# Patient Record
Sex: FEMALE | Race: WHITE | NOT HISPANIC OR LATINO | URBAN - METROPOLITAN AREA
[De-identification: names, ages, dates, MRNs, and addresses within clinical notes are randomized per-mention and may not be internally consistent; named-entity substitution may affect disease eponyms.]

---

## 2024-02-18 ENCOUNTER — INPATIENT (INPATIENT)
Facility: HOSPITAL | Age: 89
LOS: 5 days | Discharge: NURSING, REHAB OR LONG-TERM CARE | DRG: 177 | End: 2024-02-24
Attending: STUDENT IN AN ORGANIZED HEALTH CARE EDUCATION/TRAINING PROGRAM | Admitting: STUDENT IN AN ORGANIZED HEALTH CARE EDUCATION/TRAINING PROGRAM
Payer: MEDICARE

## 2024-02-18 VITALS — DIASTOLIC BLOOD PRESSURE: 79 MMHG | SYSTOLIC BLOOD PRESSURE: 142 MMHG | HEART RATE: 90 BPM | OXYGEN SATURATION: 96 %

## 2024-02-18 DIAGNOSIS — M25.461 EFFUSION, RIGHT KNEE: ICD-10-CM

## 2024-02-18 DIAGNOSIS — E78.5 HYPERLIPIDEMIA, UNSPECIFIED: ICD-10-CM

## 2024-02-18 DIAGNOSIS — D64.9 ANEMIA, UNSPECIFIED: ICD-10-CM

## 2024-02-18 DIAGNOSIS — G93.40 ENCEPHALOPATHY, UNSPECIFIED: ICD-10-CM

## 2024-02-18 DIAGNOSIS — E87.8 OTHER DISORDERS OF ELECTROLYTE AND FLUID BALANCE, NOT ELSEWHERE CLASSIFIED: ICD-10-CM

## 2024-02-18 DIAGNOSIS — K21.9 GASTRO-ESOPHAGEAL REFLUX DISEASE WITHOUT ESOPHAGITIS: ICD-10-CM

## 2024-02-18 DIAGNOSIS — R47.9 UNSPECIFIED SPEECH DISTURBANCES: ICD-10-CM

## 2024-02-18 DIAGNOSIS — Z29.9 ENCOUNTER FOR PROPHYLACTIC MEASURES, UNSPECIFIED: ICD-10-CM

## 2024-02-18 DIAGNOSIS — N39.0 URINARY TRACT INFECTION, SITE NOT SPECIFIED: ICD-10-CM

## 2024-02-18 DIAGNOSIS — R79.1 ABNORMAL COAGULATION PROFILE: ICD-10-CM

## 2024-02-18 DIAGNOSIS — E03.9 HYPOTHYROIDISM, UNSPECIFIED: ICD-10-CM

## 2024-02-18 DIAGNOSIS — F41.9 ANXIETY DISORDER, UNSPECIFIED: ICD-10-CM

## 2024-02-18 LAB
ALBUMIN SERPL ELPH-MCNC: 3.6 G/DL — SIGNIFICANT CHANGE UP (ref 3.3–5)
ALP SERPL-CCNC: 89 U/L — SIGNIFICANT CHANGE UP (ref 40–120)
ALT FLD-CCNC: 11 U/L — SIGNIFICANT CHANGE UP (ref 10–45)
ANION GAP SERPL CALC-SCNC: 13 MMOL/L — SIGNIFICANT CHANGE UP (ref 5–17)
APPEARANCE UR: ABNORMAL
APTT BLD: 31.9 SEC — SIGNIFICANT CHANGE UP (ref 24.5–35.6)
AST SERPL-CCNC: 27 U/L — SIGNIFICANT CHANGE UP (ref 10–40)
BACTERIA # UR AUTO: ABNORMAL /HPF
BASE EXCESS BLDV CALC-SCNC: 1.7 MMOL/L — SIGNIFICANT CHANGE UP (ref -2–3)
BASOPHILS # BLD AUTO: 0.01 K/UL — SIGNIFICANT CHANGE UP (ref 0–0.2)
BASOPHILS NFR BLD AUTO: 0.1 % — SIGNIFICANT CHANGE UP (ref 0–2)
BILIRUB SERPL-MCNC: 1 MG/DL — SIGNIFICANT CHANGE UP (ref 0.2–1.2)
BILIRUB UR-MCNC: ABNORMAL
BUN SERPL-MCNC: 23 MG/DL — SIGNIFICANT CHANGE UP (ref 7–23)
CA-I SERPL-SCNC: 0.82 MMOL/L — LOW (ref 1.15–1.33)
CALCIUM SERPL-MCNC: 6.2 MG/DL — CRITICAL LOW (ref 8.4–10.5)
CAST: 25 /LPF — HIGH (ref 0–4)
CHLORIDE BLDV-SCNC: 99 MMOL/L — SIGNIFICANT CHANGE UP (ref 96–108)
CHLORIDE SERPL-SCNC: 98 MMOL/L — SIGNIFICANT CHANGE UP (ref 96–108)
CO2 BLDV-SCNC: 28 MMOL/L — HIGH (ref 22–26)
CO2 SERPL-SCNC: 23 MMOL/L — SIGNIFICANT CHANGE UP (ref 22–31)
COARSE GRAN CASTS #/AREA URNS AUTO: PRESENT
COLOR SPEC: SIGNIFICANT CHANGE UP
CREAT SERPL-MCNC: 0.92 MG/DL — SIGNIFICANT CHANGE UP (ref 0.5–1.3)
DIFF PNL FLD: ABNORMAL
EGFR: 58 ML/MIN/1.73M2 — LOW
EOSINOPHIL # BLD AUTO: 0.01 K/UL — SIGNIFICANT CHANGE UP (ref 0–0.5)
EOSINOPHIL NFR BLD AUTO: 0.1 % — SIGNIFICANT CHANGE UP (ref 0–6)
FLUAV AG NPH QL: SIGNIFICANT CHANGE UP
FLUBV AG NPH QL: SIGNIFICANT CHANGE UP
GAS PNL BLDV: 131 MMOL/L — LOW (ref 136–145)
GAS PNL BLDV: SIGNIFICANT CHANGE UP
GAS PNL BLDV: SIGNIFICANT CHANGE UP
GLUCOSE BLDV-MCNC: 106 MG/DL — HIGH (ref 70–99)
GLUCOSE SERPL-MCNC: 114 MG/DL — HIGH (ref 70–99)
GLUCOSE UR QL: NEGATIVE MG/DL — SIGNIFICANT CHANGE UP
HCO3 BLDV-SCNC: 27 MMOL/L — SIGNIFICANT CHANGE UP (ref 22–29)
HCT VFR BLD CALC: 32.8 % — LOW (ref 34.5–45)
HCT VFR BLDA CALC: 32 % — LOW (ref 34.5–46.5)
HGB BLD CALC-MCNC: 10.6 G/DL — LOW (ref 11.7–16.1)
HGB BLD-MCNC: 10.4 G/DL — LOW (ref 11.5–15.5)
IMM GRANULOCYTES NFR BLD AUTO: 1.2 % — HIGH (ref 0–0.9)
INR BLD: 1.62 RATIO — HIGH (ref 0.85–1.18)
KETONES UR-MCNC: ABNORMAL MG/DL
LACTATE BLDV-MCNC: 1.4 MMOL/L — SIGNIFICANT CHANGE UP (ref 0.5–2)
LEUKOCYTE ESTERASE UR-ACNC: ABNORMAL
LYMPHOCYTES # BLD AUTO: 0.77 K/UL — LOW (ref 1–3.3)
LYMPHOCYTES # BLD AUTO: 8.2 % — LOW (ref 13–44)
MAGNESIUM SERPL-MCNC: 0.8 MG/DL — CRITICAL LOW (ref 1.6–2.6)
MCHC RBC-ENTMCNC: 31 PG — SIGNIFICANT CHANGE UP (ref 27–34)
MCHC RBC-ENTMCNC: 31.7 GM/DL — LOW (ref 32–36)
MCV RBC AUTO: 97.9 FL — SIGNIFICANT CHANGE UP (ref 80–100)
MONOCYTES # BLD AUTO: 1.25 K/UL — HIGH (ref 0–0.9)
MONOCYTES NFR BLD AUTO: 13.3 % — SIGNIFICANT CHANGE UP (ref 2–14)
NEUTROPHILS # BLD AUTO: 7.22 K/UL — SIGNIFICANT CHANGE UP (ref 1.8–7.4)
NEUTROPHILS NFR BLD AUTO: 77.1 % — HIGH (ref 43–77)
NITRITE UR-MCNC: NEGATIVE — SIGNIFICANT CHANGE UP
NRBC # BLD: 0 /100 WBCS — SIGNIFICANT CHANGE UP (ref 0–0)
PCO2 BLDV: 42 MMHG — SIGNIFICANT CHANGE UP (ref 39–42)
PH BLDV: 7.41 — SIGNIFICANT CHANGE UP (ref 7.32–7.43)
PH UR: 6.5 — SIGNIFICANT CHANGE UP (ref 5–8)
PHOSPHATE SERPL-MCNC: 3 MG/DL — SIGNIFICANT CHANGE UP (ref 2.5–4.5)
PHOSPHATE SERPL-MCNC: 3 MG/DL — SIGNIFICANT CHANGE UP (ref 2.5–4.5)
PLATELET # BLD AUTO: 152 K/UL — SIGNIFICANT CHANGE UP (ref 150–400)
PO2 BLDV: 48 MMHG — HIGH (ref 25–45)
POTASSIUM BLDV-SCNC: 4.7 MMOL/L — SIGNIFICANT CHANGE UP (ref 3.5–5.1)
POTASSIUM SERPL-MCNC: 4.4 MMOL/L — SIGNIFICANT CHANGE UP (ref 3.5–5.3)
POTASSIUM SERPL-SCNC: 4.4 MMOL/L — SIGNIFICANT CHANGE UP (ref 3.5–5.3)
PROT SERPL-MCNC: 6.3 G/DL — SIGNIFICANT CHANGE UP (ref 6–8.3)
PROT UR-MCNC: 100 MG/DL
PROTHROM AB SERPL-ACNC: 16.8 SEC — HIGH (ref 9.5–13)
RBC # BLD: 3.35 M/UL — LOW (ref 3.8–5.2)
RBC # FLD: 17 % — HIGH (ref 10.3–14.5)
RBC CASTS # UR COMP ASSIST: 38 /HPF — HIGH (ref 0–4)
REVIEW: SIGNIFICANT CHANGE UP
RSV RNA NPH QL NAA+NON-PROBE: SIGNIFICANT CHANGE UP
SAO2 % BLDV: 73.5 % — SIGNIFICANT CHANGE UP (ref 67–88)
SARS-COV-2 RNA SPEC QL NAA+PROBE: SIGNIFICANT CHANGE UP
SODIUM SERPL-SCNC: 134 MMOL/L — LOW (ref 135–145)
SP GR SPEC: 1.03 — SIGNIFICANT CHANGE UP (ref 1–1.03)
SQUAMOUS # UR AUTO: >36 /HPF — HIGH (ref 0–5)
TROPONIN T, HIGH SENSITIVITY RESULT: 51 NG/L — SIGNIFICANT CHANGE UP (ref 0–51)
TROPONIN T, HIGH SENSITIVITY RESULT: 54 NG/L — HIGH (ref 0–51)
UROBILINOGEN FLD QL: 1 MG/DL — SIGNIFICANT CHANGE UP (ref 0.2–1)
WBC # BLD: 9.37 K/UL — SIGNIFICANT CHANGE UP (ref 3.8–10.5)
WBC # FLD AUTO: 9.37 K/UL — SIGNIFICANT CHANGE UP (ref 3.8–10.5)
WBC CLUMPS # UR AUTO: PRESENT
WBC UR QL: 103 /HPF — HIGH (ref 0–5)

## 2024-02-18 PROCEDURE — 0042T: CPT | Mod: MA

## 2024-02-18 PROCEDURE — 99223 1ST HOSP IP/OBS HIGH 75: CPT | Mod: GC

## 2024-02-18 PROCEDURE — 70450 CT HEAD/BRAIN W/O DYE: CPT | Mod: 26,MA,59

## 2024-02-18 PROCEDURE — 99285 EMERGENCY DEPT VISIT HI MDM: CPT

## 2024-02-18 PROCEDURE — 70496 CT ANGIOGRAPHY HEAD: CPT | Mod: 26,MA

## 2024-02-18 PROCEDURE — 71045 X-RAY EXAM CHEST 1 VIEW: CPT | Mod: 26

## 2024-02-18 PROCEDURE — 70498 CT ANGIOGRAPHY NECK: CPT | Mod: 26,MA

## 2024-02-18 RX ORDER — CALCIUM GLUCONATE 100 MG/ML
1 VIAL (ML) INTRAVENOUS ONCE
Refills: 0 | Status: COMPLETED | OUTPATIENT
Start: 2024-02-18 | End: 2024-02-18

## 2024-02-18 RX ORDER — CEFTRIAXONE 500 MG/1
1000 INJECTION, POWDER, FOR SOLUTION INTRAMUSCULAR; INTRAVENOUS EVERY 24 HOURS
Refills: 0 | Status: DISCONTINUED | OUTPATIENT
Start: 2024-02-19 | End: 2024-02-19

## 2024-02-18 RX ORDER — ONDANSETRON 8 MG/1
4 TABLET, FILM COATED ORAL EVERY 8 HOURS
Refills: 0 | Status: DISCONTINUED | OUTPATIENT
Start: 2024-02-18 | End: 2024-02-24

## 2024-02-18 RX ORDER — PANTOPRAZOLE SODIUM 20 MG/1
1 TABLET, DELAYED RELEASE ORAL
Refills: 0 | DISCHARGE

## 2024-02-18 RX ORDER — MAGNESIUM SULFATE 500 MG/ML
2 VIAL (ML) INJECTION ONCE
Refills: 0 | Status: COMPLETED | OUTPATIENT
Start: 2024-02-18 | End: 2024-02-18

## 2024-02-18 RX ORDER — LEVOTHYROXINE SODIUM 125 MCG
1 TABLET ORAL
Refills: 0 | DISCHARGE

## 2024-02-18 RX ORDER — ACETAMINOPHEN 500 MG
2 TABLET ORAL
Refills: 0 | DISCHARGE

## 2024-02-18 RX ORDER — CEFTRIAXONE 500 MG/1
1000 INJECTION, POWDER, FOR SOLUTION INTRAMUSCULAR; INTRAVENOUS ONCE
Refills: 0 | Status: COMPLETED | OUTPATIENT
Start: 2024-02-18 | End: 2024-02-18

## 2024-02-18 RX ORDER — ALBUTEROL 90 UG/1
3 AEROSOL, METERED ORAL
Refills: 0 | DISCHARGE

## 2024-02-18 RX ORDER — MAGNESIUM SULFATE 500 MG/ML
2 VIAL (ML) INJECTION EVERY 4 HOURS
Refills: 0 | Status: COMPLETED | OUTPATIENT
Start: 2024-02-18 | End: 2024-02-19

## 2024-02-18 RX ADMIN — Medication 25 GRAM(S): at 22:40

## 2024-02-18 RX ADMIN — Medication 100 GRAM(S): at 17:22

## 2024-02-18 RX ADMIN — CEFTRIAXONE 100 MILLIGRAM(S): 500 INJECTION, POWDER, FOR SOLUTION INTRAMUSCULAR; INTRAVENOUS at 17:20

## 2024-02-18 RX ADMIN — Medication 25 GRAM(S): at 17:20

## 2024-02-18 NOTE — H&P ADULT - NSHPPHYSICALEXAM_GEN_ALL_CORE
T(C): 37.7 (02-18-24 @ 16:25), Max: 37.7 (02-18-24 @ 16:25)  HR: 98 (02-18-24 @ 16:25) (90 - 98)  BP: 132/77 (02-18-24 @ 16:25) (132/77 - 142/79)  RR: 18 (02-18-24 @ 16:25) (18 - 18)  SpO2: 98% (02-18-24 @ 16:25) (96% - 98%)    PHYSICAL EXAM:  GENERAL: NAD  HEAD:  Atraumatic, Normocephalic  EYES: PERRLA, conjunctiva and sclera clear, no jaundice   ENMT Trace white mucus in oropharynx. Dry mucous membranes  NECK: Supple, non tender   HEART: Regular rate and rhythm; No murmurs, rubs, or gallops. S1/S2 present  RESPIRATORY: CTA B/L, No W/R/R  ABDOMEN: Soft, Nontender, Nondistended; Bowel sounds present   NEUROLOGY: A&Ox1 (name), occasionally follows simple commands  EXTREMITIES: Shoulders appear symmetric in size and shape. TTP, warm and swelling of L knee and mild swelling of LLE. No clubbing, cyanosis, or edema   SKIN: dry erythematous lesion of RLE

## 2024-02-18 NOTE — ED PROVIDER NOTE - OBJECTIVE STATEMENT
Attending note.  Patient was seen at nurses station as well as CT scan.  Code stroke called upon patient arrival.  Patient was brought in from nursing home with change in speech notes at 7 AM upon patient's awakening.  Additional history from patient's son who states the speech is more garbled than slurred and is fluctuating.  He reports she did not get out of bed yesterday and was weaker.  She had a cough last week which resolved.  Her last hospitalization was approximately 3 years ago.  She has a history of dementia and will typically ambulate with walker with assistance or in wheelchair.  Patient is not able to provide any additional history.  The patient appears to have a facial droop, however her son states that that is typical appearance.

## 2024-02-18 NOTE — H&P ADULT - PROBLEM SELECTOR PLAN 2
Pt w/ change in speech this morning, likely due to infectious vs. metabolic process    -s/p code stroke, unlikely stroke- head imaging w/o acute pathology  -plan as above

## 2024-02-18 NOTE — ED ADULT NURSE NOTE - NSICDXPASTMEDICALHX_GEN_ALL_CORE_FT
PAST MEDICAL HISTORY:  Atrial fibrillation     GERD (gastroesophageal reflux disease)     Hyperlipidemia

## 2024-02-18 NOTE — H&P ADULT - NSHPLABSRESULTS_GEN_ALL_CORE
LABS: Personally reviewed labs, imaging, and ECG                          10.4   9.37  )-----------( 152      ( 2024 16:05 )             32.8           134<L>  |  98  |  23  ----------------------------<  114<H>  4.4   |  23  |  0.92    Ca    6.2<LL>      2024 16:05  Phos  3.0       Mg     .8         TPro  6.3  /  Alb  3.6  /  TBili  1.0  /  DBili  x   /  AST  27  /  ALT  11  /  AlkPhos  89         LIVER FUNCTIONS - ( 2024 16:05 )  Alb: 3.6 g/dL / Pro: 6.3 g/dL / ALK PHOS: 89 U/L / ALT: 11 U/L / AST: 27 U/L / GGT: x                    Urinalysis Basic - ( 2024 16:25 )    Color: Dark Yellow / Appearance: Turbid / S.027 / pH: x  Gluc: x / Ketone: Trace mg/dL  / Bili: Small / Urobili: 1.0 mg/dL   Blood: x / Protein: 100 mg/dL / Nitrite: Negative   Leuk Esterase: Moderate / RBC: 38 /HPF /  /HPF   Sq Epi: x / Non Sq Epi: >36 /HPF / Bacteria: Too Numerous to count /HPF        PT/INR - ( 2024 16:05 )   PT: 16.8 sec;   INR: 1.62 ratio         PTT - ( 2024 16:05 )  PTT:31.9 sec    Lactate Trend      CAPILLARY BLOOD GLUCOSE  117 (2024 19:36)      POCT Blood Glucose.: 117 mg/dL (2024 15:45)        RADIOLOGY & ADDITIONAL TESTS:    < from: CT Angio Brain Stroke Protocol  w/ IV Cont (24 @ 16:17) >    IMPRESSION:    CT PERFUSION: No focal perfusion deficit on visual assessment.    MRI brain may be obtained for further assessment.    CT ANGIOGRAPHY NECK: No vessel narrowing or occlusion in the neck.    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  _____________  NASCET criteria for internal carotid artery stenosis:  Mild: 0% to 49%  Moderate: 50% to 69%  Severe: 70% to 99%  Complete Occlusion      < end of copied text >

## 2024-02-18 NOTE — ED PROVIDER NOTE - NSICDXPASTMEDICALHX_GEN_ALL_CORE_FT
PAST MEDICAL HISTORY:  GERD (gastroesophageal reflux disease)     Hyperlipidemia      PAST MEDICAL HISTORY:  Atrial fibrillation     GERD (gastroesophageal reflux disease)     Hyperlipidemia

## 2024-02-18 NOTE — ED ADULT NURSE REASSESSMENT NOTE - NS ED NURSE REASSESS COMMENT FT1
Patient straight cath'd for urine using sterile technique. Second RN GISELLE and LEANNE present to confirm sterility. Explained procedure as it was being done - Pt tolerated procedure well. Sterile specimens collected and sent to lab as ordered. drained aprox 75ml of urine. Comfort and safety provided.

## 2024-02-18 NOTE — H&P ADULT - ATTENDING COMMENTS
89F PMHx hypothyroid, afib not on AC, GERD, HLD. Presenting w/ slurred speech and lethargy. LKW was 2/17 when pt went to bed. Code stroke upon arrival, CT scans neg for stroke; found aneurysm. Pt also found w/ low Ca and low Mg, UA suspicions for UTI. Pt also found w/ R knee swelling, which was not reported by family.     #UTI  #metabolic encephalopathy  -c/w CTX  -acute stroke r/o'd. per neuro rec, encephalopathy labs ordered.     #R knee swelling  -DDx includes gout  vs. pseudogout, vs. fall/injury vs. septic joint  -f/u uric acid level  -f/u MRSA swab since if septic joint, already on ctx  -f/u knee u/s   -consider rheum c/s     #hypoCa  #hypoMg  -likely nutritional deficiency  -ekg w/o acute changes  -replete prn

## 2024-02-18 NOTE — H&P ADULT - HISTORY OF PRESENT ILLNESS
89 yo F w/ PMHx of hypothyroidism, atrial fibrillation (not on A/C due to age), GERD, HLD presenting for change in speech and lethargy since this morning. Per son Ross, pt uses a walker at baseline and was having a difficult time getting around yesterday and was not walking or eating much. Today, pt's son was called because pt woke up and looked terrible and grey, Pt's speech was garbled, sounded like mumbling, and pt was more lethargic, she also complained of L knee pain and it was more swollen than usual. Also w/ L shoulder pain. Her son came to the living facility with his kids and she was reorientable and her speech seemed to improve. She ate a cereal. After her son left, the facility stated that pt began to deteriorate again w/ mumbling speech and they said they were going to call an ambulance. Son stated that pt also had a cough last week which she was started on albuterol for and the cough completely resolved. At baseline, pt is A&Ox2, she is usually confused but pleasant w/ poor short term memory but able to be reoriented.    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 Pt also received Mag, calcium, and ceftriaxone.

## 2024-02-18 NOTE — H&P ADULT - PROBLEM SELECTOR PLAN 1
Baseline A&Ox2, presently A&OX1 w/ garbled speech. Pt w/ change this morning, appearing more lethargic w/ change in speech. Ddx: infectious (UTI, septic knee joint?) vs. metabolic vs. neurologic     -code stroke called on 2/18, w/ nonlateralizing neuro exam  -CT head 2/18: no acute findings   -trend WBC, temp; elevated Neutrophil %  -BCx, UCx, UA+, CXR, RVP negative  -mag and calcium low  -trop 54->51, EKG   -neurology following  -Vitamin B1, B6, B12, Folate, Vitamin D 25OH, Vitamin E, TSH, FT4, Ammonia, CK  -if no clinical improvement after addressing above medical problems, consider EEG and/or MRI Brain w/wo IV contrast  -NSGY evaluation in AM given 5.5 x 5.0 x 5.8 mm aneurysm at the left A2 A3 bifurcation Baseline A&Ox2, presently A&OX1 w/ garbled speech. Pt w/ change this morning, appearing more lethargic w/ change in speech. Ddx: infectious (UTI, septic knee joint?) vs. metabolic vs. neurologic     -code stroke called on 2/18, w/ nonlateralizing neuro exam  -CT head 2/18: no acute findings   -trend WBC, temp; elevated Neutrophil %  -BCx, UCx, UA+, CXR, RVP negative, MRSA  -s/p ceftriaxone, c/w ceftriaxone; consider addition of cefazolin for gram+  -mag and calcium low  -trop 54->51, EKG   -neurology following  -Vitamin B1, B6, B12, Folate, Vitamin D 25OH, Vitamin E, TSH, FT4, Ammonia, CK  -if no clinical improvement after addressing above medical problems, consider EEG and/or MRI Brain w/wo IV contrast  -NSGY evaluation in AM given 5.5 x 5.0 x 5.8 mm aneurysm at the left A2 A3 bifurcation Baseline A&Ox2, presently A&OX1 w/ garbled speech. Pt w/ change this morning, appearing more lethargic w/ change in speech. Ddx: infectious (UTI, septic knee joint?) vs. metabolic vs. neurologic     -code stroke called on 2/18, w/ nonlateralizing neuro exam  -CT head 2/18: no acute findings   -trend WBC, temp; elevated Neutrophil %  -BCx, UCx, UA+, CXR, RVP negative, MRSA  -s/p ceftriaxone, c/w ceftriaxone  -mag and calcium low  -trop 54->51, EKG   -neurology following  -Vitamin B1, B6, B12, Folate, Vitamin D 25OH, Vitamin E, TSH, FT4, Ammonia, CK  -if no clinical improvement after addressing above medical problems, consider EEG and/or MRI Brain w/wo IV contrast  -NSGY evaluation in AM given 5.5 x 5.0 x 5.8 mm aneurysm at the left A2 A3 bifurcation

## 2024-02-18 NOTE — ED PROVIDER NOTE - DIFFERENTIAL DIAGNOSIS
Differential Diagnosis Garbled speech and decreased appetite and energy yesterday.  Differential not limited to CVA/TIA versus infection versus metabolic/electrolyte abnormality.

## 2024-02-18 NOTE — H&P ADULT - PROBLEM SELECTOR PLAN 5
Pt found to have Mag of 0.8 and Ca 5    -s/p Mag, Ca x1  -c/w repletion  -pending dysphagia can supplement mag PO Pt found to have Mag of 0.8 and Ca 5    -s/p Mag, Ca x1  -c/w repletion  -pending dysphagia screen can supplement mag PO

## 2024-02-18 NOTE — H&P ADULT - PROBLEM SELECTOR PLAN 10
DVT ppx: lovenox    Diet: NPO iso AMS, pending S&S    Dispo: TBA    Ethics: Full Code for now, son Ross is leaning towards DNR/DNI but will talk with his siblings DVT ppx: SCDs    Diet: NPO iso AMS, pending S&S    Dispo: TBA    Ethics: Full Code for now, son Ross is leaning towards DNR/DNI but will talk with his siblings

## 2024-02-18 NOTE — ED PROVIDER NOTE - PHYSICAL EXAMINATION
Attending note.  Patient is arousable with verbal stimulation.  Pupils are 2 mm equal reactive.  She has dry mucous membranes.  Neck is supple.  Lungs are clear and equal bilaterally.  Heart is regular in rhythm.  Abdomen is obese, soft and nontender.  There is no guarding or rebound.  Patient has arthritic changes and swelling to both knees.  There is pitting edema in the ankles bilaterally.  Patient's speech is garbled.  There is no pronator drift in the upper extremity.  She has good  strength in both upper extremities.  Patient does not follow directions to left her legs.

## 2024-02-18 NOTE — CONSULT NOTE ADULT - ASSESSMENT
Impression: Altered Mental Status with nonfocal neuro exam, likely i/s/o toxic metabolic infectious processes such as UTI, hypomagnesemia, and hypocalcemia.     Recommendations:  [ ] Toxic metabolic workup: Infectious workup (BCx, UCx, UA, CXR, RVP+COVID), CBC, CMP, Mg, Phos, Vitamin B1, B6, B12, Folate, Vitamin D 25OH, Vitamin E, TSH, FT4, Ammonia, Lactate, CK,   [ ] If no clinical improvement after addressing above medical problems, consider EEG and/or MRI Brain w/wo IV contrast    Case to be seen by neuro attending on AM rounds. Recommendations not finalized until attested by attending.   Impression: Altered Mental Status with nonfocal neuro exam, likely i/s/o toxic metabolic infectious processes such as UTI, hypomagnesemia, and hypocalcemia. Lower suspicion for primary neurologic process at this time.     Recommendations:  [ ] Toxic metabolic workup: Infectious workup (BCx, UCx, UA, CXR, RVP+COVID), CBC, CMP, Mg, Phos, Vitamin B1, B6, B12, Folate, Vitamin D 25OH, Vitamin E, TSH, FT4, Ammonia, Lactate, CK,   [ ] If no clinical improvement after addressing above medical problems, consider EEG and/or MRI Brain w/wo IV contrast    Case to be seen by neuro attending on AM rounds. Recommendations not finalized until attested by attending.   Impression: Altered Mental Status with nonlateralizing neuro exam, likely i/s/o toxic metabolic infectious processes such as UTI, hypomagnesemia, and hypocalcemia. Lower suspicion for primary neurologic process at this time.     Recommendations:  [ ] Toxic metabolic workup: Infectious workup (BCx, UCx, UA, CXR, RVP+COVID), CBC, CMP, Mg, Phos, Vitamin B1, B6, B12, Folate, Vitamin D 25OH, Vitamin E, TSH, FT4, Ammonia, Lactate, CK,   [ ] If no clinical improvement after addressing above medical problems, consider EEG and/or MRI Brain w/wo IV contrast    Case to be seen by neuro attending on AM rounds. Recommendations not finalized until attested by attending.   Impression: Altered Mental Status with nonlateralizing neuro exam, CTH without acute pathology. AMS likely i/s/o toxic metabolic infectious processes such as UTI, hypomagnesemia, and hypocalcemia. Lower suspicion for primary neurologic process at this time.     Recommendations:  [ ] Toxic metabolic workup: Infectious workup (BCx, UCx, UA, CXR, RVP+COVID), CBC, CMP, Mg, Phos, Vitamin B1, B6, B12, Folate, Vitamin D 25OH, Vitamin E, TSH, FT4, Ammonia, Lactate, CK,   [ ] If no clinical improvement after addressing above medical problems, consider EEG and/or MRI Brain w/wo IV contrast  [ ] Consider NSGY evaluation given 5.5 x 5.0 x 5.8 mm aneurysm at the left A2 A3 bifurcation.    Case to be seen by neuro attending on AM rounds. Recommendations not finalized until attested by attending.   89F pmhx dementia (~AAOX1-2 at baseline), presents as code stroke for lethargy and mumbling. LKW 6:30 PM on 2/17/2024. Per son, pt was noted to be lethargic, not getting out of bed, and mumbling her words as of 7 AM. RoS potentially limited due to mental status, but pt denying focal weakness, numbness/tingling, HA, visual changes. No AC/AP. Uses walker/wheelchair at baseline. In ED T 99.9 oral, SBPs 130s-140s, HR 90s, satting 96 on RA, found to have Ca 6.2, Mg 0.8, and UA c/f UTI (Bacteria too numerous to count, moderate LE, 103 WBCs).     Impression: Altered Mental Status with nonlateralizing neuro exam, CTH without acute pathology. AMS likely i/s/o toxic metabolic infectious processes such as UTI, hypomagnesemia, and hypocalcemia. Lower suspicion for primary neurologic process at this time.     Recommendations:  [ ] Toxic metabolic workup: Infectious workup (BCx, UCx, UA, CXR, RVP+COVID), CBC, CMP, Mg, Phos, Vitamin B1, B6, B12, Folate, Vitamin D 25OH, Vitamin E, TSH, FT4, Ammonia, Lactate, CK,   [ ] If no clinical improvement after addressing above medical problems, consider EEG and/or MRI Brain w/wo IV contrast  [ ] Consider NSGY evaluation given 5.5 x 5.0 x 5.8 mm aneurysm at the left A2 A3 bifurcation.    Case to be seen by neuro attending on AM rounds. Recommendations not finalized until attested by attending.

## 2024-02-18 NOTE — ED ADULT NURSE NOTE - OBJECTIVE STATEMENT
89y female, 89y female, AAOx2, follows commands, amb with walker at baseline, reports chronic LE weakness, LKW 7 am, son reports altered speech, symptoms resolved upon arrival in ED, pt following commands with UE, minimal LE is baseline, denies headache, chest pain, shortness of breath, son reports pt is weaker than normal, 20g right ac, 20g left hand, on cm, placed in gown, side rails up for safety, bed in lowest position, call bell within reach, patient and family educated on plan of care, comfort and safety provided.

## 2024-02-18 NOTE — H&P ADULT - ASSESSMENT
89 yo F w/ PMHx of hypothyroidism, atrial fibrillation (not on A/C due to age), GERD, HLD presenting for change in speech and lethargy since this morning, as well as L knee pain/swelling, w/o acute changes on CT head, found to have UTI and electrolyte derangements, admitted for encephalopathy and L knee pain w/u.

## 2024-02-18 NOTE — CONSULT NOTE ADULT - SUBJECTIVE AND OBJECTIVE BOX
Neurology - Consult Note    -  Spectra: [phone] ([facility]), [phone] ([facility])  -    HPI: 89F pmhx dementia (~AAOX1-2 at baseline), presents as code stroke for lethargy and mumbling. LKW 6:30 PM on 2/17/2024. Per son, pt was noted to be lethargic, not getting out of bed, and mumbling her words as of 7 AM. RoS potentially limited due to mental status, but pt denying focal weakness, numbness/tingling, HA, visual changes. No AC/AP. Uses walker/wheelchair at baseline. In ED T 99.9 oral, SBPs 130s-140s, HR 90s, satting 96 on RA, found to have Ca 6.2, Mg 0.8, and UA c/f UTI (Bacteria too numerous to count, moderate LE, 103 WBCs).     Allergies:  No Known Allergies      PMHx/PSHx/Family Hx: As above, otherwise see below   Hyperlipidemia    GERD (gastroesophageal reflux disease)    Atrial fibrillation        Social Hx:  No current use of tobacco, alcohol, or illicit drugs  Lives with ***    Medications:  MEDICATIONS  (STANDING):    MEDICATIONS  (PRN):      Vitals:  T(C): 37.7 (02-18-24 @ 16:25), Max: 37.7 (02-18-24 @ 16:25)  HR: 98 (02-18-24 @ 16:25) (90 - 98)  BP: 132/77 (02-18-24 @ 16:25) (132/77 - 142/79)  RR: 18 (02-18-24 @ 16:25) (18 - 18)  SpO2: 98% (02-18-24 @ 16:25) (96% - 98%)    Physical Examination:  General - NAD  Cardiovascular - no edema  Eyes -Fundoscopy not well visualized    Neurologic Exam:  Mental status - Lethargic but arousable to LT/voice, attends to examiner. Follows simple commands but struggles with complex ones. Language fluent with intact comprehension and repetition, but speech slurred (of note, pt without dentures). Poor concentration, registration of word, fund of knowledge    Cranial nerves - PERRL, VFF, EOMI, face sensation (V1-V3) intact b/l, facial strength intact without asymmetry b/l, trapezius 5/5 strength b/l, tongue midline on protrusion    Motor - Normal bulk and tone throughout. Maintains extremities antigravity without drift. Unable to perform manual motor strength testing because unable to follow commands.    Sensation - Sensation intact to LT and pain in all extremities    DTR's -             Biceps      Triceps     Brachioradialis      Patellar    Ankle    Toes/plantar response  R             0             0                    0                 0             0                   Mute  L             0             0                    0                 0             0                   Mute    Coordination - No dysmetria on FTN b/l    Gait and station - VIOLA    Labs:                        10.4   9.37  )-----------( 152      ( 18 Feb 2024 16:05 )             32.8     02-18    134<L>  |  98  |  23  ----------------------------<  114<H>  4.4   |  23  |  0.92    Ca    6.2<LL>      18 Feb 2024 16:05  Phos  3.0     02-18  Mg     .8     02-18    TPro  6.3  /  Alb  3.6  /  TBili  1.0  /  DBili  x   /  AST  27  /  ALT  11  /  AlkPhos  89  02-18    CAPILLARY BLOOD GLUCOSE  117 (18 Feb 2024 19:36)      POCT Blood Glucose.: 117 mg/dL (18 Feb 2024 15:45)    LIVER FUNCTIONS - ( 18 Feb 2024 16:05 )  Alb: 3.6 g/dL / Pro: 6.3 g/dL / ALK PHOS: 89 U/L / ALT: 11 U/L / AST: 27 U/L / GGT: x             PT/INR - ( 18 Feb 2024 16:05 )   PT: 16.8 sec;   INR: 1.62 ratio         PTT - ( 18 Feb 2024 16:05 )  PTT:31.9 sec  CSF:                  Radiology:     Neurology - Consult Note    -  Spectra: [id number] ([facility]), [phone] ([facility])  -    HPI: 89F pmhx dementia (~AAOX1-2 at baseline), presents as code stroke for lethargy and mumbling. LKW 6:30 PM on 2/17/2024. Per son, pt was noted to be lethargic, not getting out of bed, and mumbling her words as of 7 AM. RoS potentially limited due to mental status, but pt denying focal weakness, numbness/tingling, HA, visual changes. No AC/AP. Uses walker/wheelchair at baseline. In ED T 99.9 oral, SBPs 130s-140s, HR 90s, satting 96 on RA, found to have Ca 6.2, Mg 0.8, and UA c/f UTI (Bacteria too numerous to count, moderate LE, 103 WBCs).     Allergies:  No Known Allergies      PMHx/PSHx/Family Hx: As above, otherwise see below   Hyperlipidemia    GERD (gastroesophageal reflux disease)    Atrial fibrillation        Social Hx:  No current use of tobacco, alcohol, or illicit drugs  Lives with ***    Medications:  MEDICATIONS  (STANDING):    MEDICATIONS  (PRN):      Vitals:  T(C): 37.7 (02-18-24 @ 16:25), Max: 37.7 (02-18-24 @ 16:25)  HR: 98 (02-18-24 @ 16:25) (90 - 98)  BP: 132/77 (02-18-24 @ 16:25) (132/77 - 142/79)  RR: 18 (02-18-24 @ 16:25) (18 - 18)  SpO2: 98% (02-18-24 @ 16:25) (96% - 98%)    Physical Examination:  General - NAD  Cardiovascular - no edema  Eyes -Fundoscopy not well visualized    Neurologic Exam:  Mental status - Lethargic but arousable to LT/voice, attends to examiner. Follows simple commands but struggles with complex ones. Language fluent with intact comprehension and repetition, but speech slurred (of note, pt without dentures). Poor concentration, registration of word, fund of knowledge. AAOx1     Cranial nerves - PERRL, VFF, EOMI, face sensation (V1-V3) intact b/l, facial strength intact without asymmetry b/l, trapezius 5/5 strength b/l, tongue midline on protrusion    Motor - Normal bulk and tone throughout. Maintains extremities antigravity without drift. Unable to perform manual motor strength testing because unable to follow commands.    Sensation - Sensation intact to LT and pain in all extremities    DTR's -             Biceps      Triceps     Brachioradialis      Patellar    Ankle    Toes/plantar response  R             0             0                    0                 0             0                   Mute  L             0             0                    0                 0             0                   Mute    Coordination - No dysmetria on FTN b/l    Gait and station - VIOLA    Labs:                        10.4   9.37  )-----------( 152      ( 18 Feb 2024 16:05 )             32.8     02-18    134<L>  |  98  |  23  ----------------------------<  114<H>  4.4   |  23  |  0.92    Ca    6.2<LL>      18 Feb 2024 16:05  Phos  3.0     02-18  Mg     .8     02-18    TPro  6.3  /  Alb  3.6  /  TBili  1.0  /  DBili  x   /  AST  27  /  ALT  11  /  AlkPhos  89  02-18    CAPILLARY BLOOD GLUCOSE  117 (18 Feb 2024 19:36)      POCT Blood Glucose.: 117 mg/dL (18 Feb 2024 15:45)    LIVER FUNCTIONS - ( 18 Feb 2024 16:05 )  Alb: 3.6 g/dL / Pro: 6.3 g/dL / ALK PHOS: 89 U/L / ALT: 11 U/L / AST: 27 U/L / GGT: x             PT/INR - ( 18 Feb 2024 16:05 )   PT: 16.8 sec;   INR: 1.62 ratio         PTT - ( 18 Feb 2024 16:05 )  PTT:31.9 sec  CSF:                  Radiology:

## 2024-02-18 NOTE — H&P ADULT - PROBLEM SELECTOR PLAN 3
Hx of recurrent UTIs, however last one was last spring.    -s/p ceftriaxone  -consider cefepime for tx of UTI + Hx of recurrent UTIs, however last one was last spring.    -s/p ceftriaxone  -c/w ceftriaxone; consider addition of cefazolin for gram+ Hx of recurrent UTIs, however last one was last spring.    -s/p ceftriaxone  -c/w ceftriaxone

## 2024-02-18 NOTE — STROKE CODE NOTE - NIH STROKE SCALE: 5B. MOTOR ARM, RIGHT, QM
[General Appearance - In No Acute Distress] : in no acute distress [General Appearance - Alert] : alert [General Appearance - Well Nourished] : well nourished [General Appearance - Well Developed] : well developed [Sclera] : the sclera and conjunctiva were normal [Oropharynx] : the oropharynx was normal [Outer Ear] : the ears and nose were normal in appearance [Neck Cervical Mass (___cm)] : no neck mass was observed [Neck Appearance] : the appearance of the neck was normal [Jugular Venous Distention Increased] : there was no jugular-venous distention [Thyroid Diffuse Enlargement] : the thyroid was not enlarged [Auscultation Breath Sounds / Voice Sounds] : lungs were clear to auscultation bilaterally [Thyroid Nodule] : there were no palpable thyroid nodules [Heart Sounds Gallop] : no gallops [Heart Rate And Rhythm] : heart rate was normal and rhythm regular [Heart Sounds] : normal S1 and S2 [Heart Sounds Pericardial Friction Rub] : no pericardial rub [Murmurs] : no murmurs [Edema] : there was no peripheral edema [Bowel Sounds] : normal bowel sounds [Full Pulse] : the pedal pulses are present [Abdomen Soft] : soft [] : no hepato-splenomegaly [Abdomen Tenderness] : non-tender [Abdomen Mass (___ Cm)] : no abdominal mass palpated (0) No drift; limb holds 90 (or 45) degrees for full 10 secs [Internal Hemorrhoid] : internal hemorrhoids [Normal Sphincter Tone] : normal sphincter tone [No Rectal Mass] : no rectal mass [Prostate Size___ (Scale 0-4)] : prostate size was [unfilled] on a scale of 0-4 [Cervical Lymph Nodes Enlarged Posterior Bilaterally] : posterior cervical [Supraclavicular Lymph Nodes Enlarged Bilaterally] : supraclavicular [Axillary Lymph Nodes Enlarged Bilaterally] : axillary [Cervical Lymph Nodes Enlarged Anterior Bilaterally] : anterior cervical [Femoral Lymph Nodes Enlarged Bilaterally] : femoral [No Spinal Tenderness] : no spinal tenderness [No CVA Tenderness] : no ~M costovertebral angle tenderness [Inguinal Lymph Nodes Enlarged Bilaterally] : inguinal [Skin Color & Pigmentation] : normal skin color and pigmentation [Skin Turgor] : normal skin turgor [Affect] : the affect was normal [Impaired Insight] : insight and judgment were intact [Oriented To Time, Place, And Person] : oriented to person, place, and time [External Hemorrhoid] : no external hemorrhoids [Occult Blood Positive] : stool was negative for occult blood [FreeTextEntry1] : FIT negative [Prostate Tenderness] : was not tender

## 2024-02-18 NOTE — H&P ADULT - PROBLEM SELECTOR PLAN 4
Pt w/ worsening warmth and swelling of L knee and pain, started yesterday. Unknown hx of trauma. Also w/ complaint of L shoulder pain, however appears normal. Ddx of L knee: septic joint vs. gout vs. pseudogout    -c/w cefepime for empiric coverage  -f/u knee US  -f/u duplex, r/o DVT Pt w/ worsening warmth and swelling of L knee and pain, started yesterday. Unknown hx of trauma. Also w/ complaint of L shoulder pain, however appears normal. Ddx of L knee: septic joint vs. gout vs. pseudogout    -rheum consult in AM  -c/w ceftriaxone  -f/u knee US  -f/u duplex, r/o DVT  -f/u MRSA swab

## 2024-02-18 NOTE — ED ADULT NURSE NOTE - NSFALLHARMRISKINTERV_ED_ALL_ED

## 2024-02-18 NOTE — ED PROVIDER NOTE - CLINICAL SUMMARY MEDICAL DECISION MAKING FREE TEXT BOX
Attending note.  See differential above.  Code stroke called upon arrival to the emergency department.  CT head, CT angio head and neck, neurology consultation, labs, EKG troponin, nasal swab, urinalysis, cultures, rectal temperature, VBG

## 2024-02-18 NOTE — PATIENT PROFILE ADULT - FALL HARM RISK - UNIVERSAL INTERVENTIONS
Bed in lowest position, wheels locked, appropriate side rails in place/Call bell, personal items and telephone in reach/Instruct patient to call for assistance before getting out of bed or chair/Non-slip footwear when patient is out of bed/[place] to call system/Physically safe environment - no spills, clutter or unnecessary equipment/Purposeful Proactive Rounding/Room/bathroom lighting operational, light cord in reach

## 2024-02-18 NOTE — H&P ADULT - NSHPSOCIALHISTORY_GEN_ALL_CORE
Pt has history of drinking alcohol more frequently >15 years ago but presently will drink 2 glass of wine a week on occasion. Pt also has hx of smoking cigarettes >70 years ago and no longer smokes.

## 2024-02-19 DIAGNOSIS — Z86.79 PERSONAL HISTORY OF OTHER DISEASES OF THE CIRCULATORY SYSTEM: ICD-10-CM

## 2024-02-19 LAB
ADD ON TEST-SPECIMEN IN LAB: SIGNIFICANT CHANGE UP
ALBUMIN SERPL ELPH-MCNC: 3.5 G/DL — SIGNIFICANT CHANGE UP (ref 3.3–5)
ALP SERPL-CCNC: 93 U/L — SIGNIFICANT CHANGE UP (ref 40–120)
ALT FLD-CCNC: 9 U/L — LOW (ref 10–45)
AMMONIA BLD-MCNC: 24 UMOL/L — SIGNIFICANT CHANGE UP (ref 11–55)
ANION GAP SERPL CALC-SCNC: 16 MMOL/L — SIGNIFICANT CHANGE UP (ref 5–17)
APTT BLD: 30.6 SEC — SIGNIFICANT CHANGE UP (ref 24.5–35.6)
AST SERPL-CCNC: 16 U/L — SIGNIFICANT CHANGE UP (ref 10–40)
BASOPHILS # BLD AUTO: 0 K/UL — SIGNIFICANT CHANGE UP (ref 0–0.2)
BASOPHILS NFR BLD AUTO: 0 % — SIGNIFICANT CHANGE UP (ref 0–2)
BILIRUB SERPL-MCNC: 0.8 MG/DL — SIGNIFICANT CHANGE UP (ref 0.2–1.2)
BUN SERPL-MCNC: 18 MG/DL — SIGNIFICANT CHANGE UP (ref 7–23)
CALCIUM SERPL-MCNC: 7.3 MG/DL — LOW (ref 8.4–10.5)
CHLORIDE SERPL-SCNC: 98 MMOL/L — SIGNIFICANT CHANGE UP (ref 96–108)
CK SERPL-CCNC: 168 U/L — SIGNIFICANT CHANGE UP (ref 25–170)
CO2 SERPL-SCNC: 22 MMOL/L — SIGNIFICANT CHANGE UP (ref 22–31)
CREAT SERPL-MCNC: 0.74 MG/DL — SIGNIFICANT CHANGE UP (ref 0.5–1.3)
CRP SERPL-MCNC: 259 MG/L — HIGH (ref 0–4)
EGFR: 75 ML/MIN/1.73M2 — SIGNIFICANT CHANGE UP
EOSINOPHIL # BLD AUTO: 0.02 K/UL — SIGNIFICANT CHANGE UP (ref 0–0.5)
EOSINOPHIL NFR BLD AUTO: 0.2 % — SIGNIFICANT CHANGE UP (ref 0–6)
ERYTHROCYTE [SEDIMENTATION RATE] IN BLOOD: 38 MM/HR — HIGH (ref 0–20)
FERRITIN SERPL-MCNC: 262 NG/ML — SIGNIFICANT CHANGE UP (ref 13–330)
FOLATE SERPL-MCNC: >20 NG/ML — SIGNIFICANT CHANGE UP
GLUCOSE SERPL-MCNC: 101 MG/DL — HIGH (ref 70–99)
HCT VFR BLD CALC: 33.2 % — LOW (ref 34.5–45)
HGB BLD-MCNC: 10.5 G/DL — LOW (ref 11.5–15.5)
IMM GRANULOCYTES NFR BLD AUTO: 1.2 % — HIGH (ref 0–0.9)
INR BLD: 1.33 RATIO — HIGH (ref 0.85–1.18)
IRON SATN MFR SERPL: 25 UG/DL — LOW (ref 30–160)
IRON SATN MFR SERPL: 9 % — LOW (ref 14–50)
LYMPHOCYTES # BLD AUTO: 0.64 K/UL — LOW (ref 1–3.3)
LYMPHOCYTES # BLD AUTO: 7.5 % — LOW (ref 13–44)
MAGNESIUM SERPL-MCNC: 2.3 MG/DL — SIGNIFICANT CHANGE UP (ref 1.6–2.6)
MCHC RBC-ENTMCNC: 31.6 GM/DL — LOW (ref 32–36)
MCHC RBC-ENTMCNC: 31.6 PG — SIGNIFICANT CHANGE UP (ref 27–34)
MCV RBC AUTO: 100 FL — SIGNIFICANT CHANGE UP (ref 80–100)
MONOCYTES # BLD AUTO: 0.6 K/UL — SIGNIFICANT CHANGE UP (ref 0–0.9)
MONOCYTES NFR BLD AUTO: 7.1 % — SIGNIFICANT CHANGE UP (ref 2–14)
NEUTROPHILS # BLD AUTO: 7.14 K/UL — SIGNIFICANT CHANGE UP (ref 1.8–7.4)
NEUTROPHILS NFR BLD AUTO: 84 % — HIGH (ref 43–77)
NRBC # BLD: 0 /100 WBCS — SIGNIFICANT CHANGE UP (ref 0–0)
PHOSPHATE SERPL-MCNC: 2.9 MG/DL — SIGNIFICANT CHANGE UP (ref 2.5–4.5)
PLATELET # BLD AUTO: 136 K/UL — LOW (ref 150–400)
POTASSIUM SERPL-MCNC: 3.6 MMOL/L — SIGNIFICANT CHANGE UP (ref 3.5–5.3)
POTASSIUM SERPL-SCNC: 3.6 MMOL/L — SIGNIFICANT CHANGE UP (ref 3.5–5.3)
PROT SERPL-MCNC: 6.3 G/DL — SIGNIFICANT CHANGE UP (ref 6–8.3)
PROTHROM AB SERPL-ACNC: 13.8 SEC — HIGH (ref 9.5–13)
RBC # BLD: 3.32 M/UL — LOW (ref 3.8–5.2)
RBC # FLD: 16.6 % — HIGH (ref 10.3–14.5)
SODIUM SERPL-SCNC: 136 MMOL/L — SIGNIFICANT CHANGE UP (ref 135–145)
T4 FREE SERPL-MCNC: 1.2 NG/DL — SIGNIFICANT CHANGE UP (ref 0.9–1.8)
TIBC SERPL-MCNC: 280 UG/DL — SIGNIFICANT CHANGE UP (ref 220–430)
TSH SERPL-MCNC: 1.5 UIU/ML — SIGNIFICANT CHANGE UP (ref 0.27–4.2)
UIBC SERPL-MCNC: 255 UG/DL — SIGNIFICANT CHANGE UP (ref 110–370)
URATE SERPL-MCNC: 5.3 MG/DL — SIGNIFICANT CHANGE UP (ref 2.5–7)
URATE SERPL-MCNC: 5.6 MG/DL — SIGNIFICANT CHANGE UP (ref 2.5–7)
VIT B12 SERPL-MCNC: 1194 PG/ML — SIGNIFICANT CHANGE UP (ref 232–1245)
VIT D25+D1,25 OH+D1,25 PNL SERPL-MCNC: 85 PG/ML — HIGH (ref 19.9–79.3)
WBC # BLD: 8.5 K/UL — SIGNIFICANT CHANGE UP (ref 3.8–10.5)
WBC # FLD AUTO: 8.5 K/UL — SIGNIFICANT CHANGE UP (ref 3.8–10.5)

## 2024-02-19 PROCEDURE — 99232 SBSQ HOSP IP/OBS MODERATE 35: CPT

## 2024-02-19 PROCEDURE — 73562 X-RAY EXAM OF KNEE 3: CPT | Mod: 26,LT

## 2024-02-19 PROCEDURE — 93971 EXTREMITY STUDY: CPT | Mod: 26,LT

## 2024-02-19 RX ORDER — AZITHROMYCIN 500 MG/1
500 TABLET, FILM COATED ORAL EVERY 24 HOURS
Refills: 0 | Status: DISCONTINUED | OUTPATIENT
Start: 2024-02-19 | End: 2024-02-20

## 2024-02-19 RX ORDER — ENOXAPARIN SODIUM 100 MG/ML
40 INJECTION SUBCUTANEOUS EVERY 24 HOURS
Refills: 0 | Status: DISCONTINUED | OUTPATIENT
Start: 2024-02-19 | End: 2024-02-24

## 2024-02-19 RX ORDER — CEFTRIAXONE 500 MG/1
1000 INJECTION, POWDER, FOR SOLUTION INTRAMUSCULAR; INTRAVENOUS EVERY 24 HOURS
Refills: 0 | Status: DISCONTINUED | OUTPATIENT
Start: 2024-02-19 | End: 2024-02-20

## 2024-02-19 RX ORDER — CHLORHEXIDINE GLUCONATE 213 G/1000ML
1 SOLUTION TOPICAL DAILY
Refills: 0 | Status: DISCONTINUED | OUTPATIENT
Start: 2024-02-19 | End: 2024-02-24

## 2024-02-19 RX ORDER — ACETAMINOPHEN 500 MG
1000 TABLET ORAL ONCE
Refills: 0 | Status: COMPLETED | OUTPATIENT
Start: 2024-02-19 | End: 2024-02-19

## 2024-02-19 RX ADMIN — Medication 400 MILLIGRAM(S): at 06:45

## 2024-02-19 RX ADMIN — AZITHROMYCIN 255 MILLIGRAM(S): 500 TABLET, FILM COATED ORAL at 13:48

## 2024-02-19 RX ADMIN — CHLORHEXIDINE GLUCONATE 1 APPLICATION(S): 213 SOLUTION TOPICAL at 12:20

## 2024-02-19 RX ADMIN — Medication 25 GRAM(S): at 02:17

## 2024-02-19 RX ADMIN — CEFTRIAXONE 100 MILLIGRAM(S): 500 INJECTION, POWDER, FOR SOLUTION INTRAMUSCULAR; INTRAVENOUS at 18:01

## 2024-02-19 RX ADMIN — ENOXAPARIN SODIUM 40 MILLIGRAM(S): 100 INJECTION SUBCUTANEOUS at 13:49

## 2024-02-19 RX ADMIN — Medication 100 GRAM(S): at 02:11

## 2024-02-19 RX ADMIN — Medication 1000 MILLIGRAM(S): at 07:30

## 2024-02-19 NOTE — PROGRESS NOTE ADULT - PROBLEM SELECTOR PLAN 4
Pt w/ worsening warmth and swelling of L knee and pain, started yesterday. Unknown hx of trauma. Also w/ complaint of L shoulder pain, however appears normal. Ddx of L knee: septic joint vs. gout vs. pseudogout    -rheum consult in AM  -c/w ceftriaxone  -f/u knee US  -f/u duplex, r/o DVT  -f/u MRSA swab

## 2024-02-19 NOTE — PROGRESS NOTE ADULT - SUBJECTIVE AND OBJECTIVE BOX
DATE OF SERVICE: 24 @ 07:18    Patient is a 89y old  Female who presents with a chief complaint of AMS, garbled speech (2024 21:08)      SUBJECTIVE / OVERNIGHT EVENTS:    MEDICATIONS  (STANDING):  cefTRIAXone   IVPB 1000 milliGRAM(s) IV Intermittent every 24 hours  chlorhexidine 4% Liquid 1 Application(s) Topical daily    MEDICATIONS  (PRN):  ondansetron Injectable 4 milliGRAM(s) IV Push every 8 hours PRN Nausea and/or Vomiting      Vital Signs Last 24 Hrs  T(C): 36.6 (2024 04:45), Max: 37.7 (2024 16:25)  T(F): 97.9 (2024 04:45), Max: 99.9 (2024 16:25)  HR: 75 (2024 04:45) (75 - 98)  BP: 136/85 (2024 04:45) (122/88 - 142/79)  BP(mean): --  RR: 16 (2024 04:45) (16 - 18)  SpO2: 91% (2024 04:45) (91% - 98%)    Parameters below as of 2024 04:45  Patient On (Oxygen Delivery Method): room air      CAPILLARY BLOOD GLUCOSE  117 (2024 19:36)      POCT Blood Glucose.: 117 mg/dL (2024 15:45)    I&O's Summary    2024 07:01  -  2024 07:00  --------------------------------------------------------  IN: 0 mL / OUT: 200 mL / NET: -200 mL        PHYSICAL EXAM:  GENERAL: NAD, well-developed  HEAD:  Atraumatic, Normocephalic  EYES: EOMI, PERRLA, conjunctiva and sclera clear  NECK: Supple, No JVD  CHEST/LUNG: Clear to auscultation bilaterally; No wheeze  HEART: Regular rate and rhythm; No murmurs, rubs, or gallops  ABDOMEN: Soft, Nontender, Nondistended; Bowel sounds present  EXTREMITIES:  2+ Peripheral Pulses, No clubbing, cyanosis, or edema  PSYCH: AAOx3  NEUROLOGY: non-focal  SKIN: No rashes or lesions    LABS:                        10.4   9.37  )-----------( 152      ( 2024 16:05 )             32.8     -18    134<L>  |  98  |  23  ----------------------------<  114<H>  4.4   |  23  |  0.92    Ca    6.2<LL>      2024 16:05  Phos  3.0       Mg     .8         TPro  6.3  /  Alb  3.6  /  TBili  1.0  /  DBili  x   /  AST  27  /  ALT  11  /  AlkPhos  89  -18    PT/INR - ( 2024 16:05 )   PT: 16.8 sec;   INR: 1.62 ratio         PTT - ( 2024 16:05 )  PTT:31.9 sec      Urinalysis Basic - ( 2024 16:25 )    Color: Dark Yellow / Appearance: Turbid / S.027 / pH: x  Gluc: x / Ketone: Trace mg/dL  / Bili: Small / Urobili: 1.0 mg/dL   Blood: x / Protein: 100 mg/dL / Nitrite: Negative   Leuk Esterase: Moderate / RBC: 38 /HPF /  /HPF   Sq Epi: x / Non Sq Epi: >36 /HPF / Bacteria: Too Numerous to count /HPF        RADIOLOGY & ADDITIONAL TESTS:    Imaging Personally Reviewed:    Consultant(s) Notes Reviewed:      Care Discussed with Consultants/Other Providers:   DATE OF SERVICE: 24 @ 07:18    Patient is a 89y old  Female who presents with a chief complaint of AMS, garbled speech (2024 21:08)    89 yo F w/ PMHx of hypothyroidism, atrial fibrillation (not on A/C due to age), GERD, HLD presenting for change in speech and lethargy since this morning.     SUBJECTIVE / OVERNIGHT EVENTS: NAEO. Patient does not report any chest pain,     MEDICATIONS  (STANDING):  cefTRIAXone   IVPB 1000 milliGRAM(s) IV Intermittent every 24 hours  chlorhexidine 4% Liquid 1 Application(s) Topical daily    MEDICATIONS  (PRN):  ondansetron Injectable 4 milliGRAM(s) IV Push every 8 hours PRN Nausea and/or Vomiting      Vital Signs Last 24 Hrs  T(C): 36.6 (2024 04:45), Max: 37.7 (2024 16:25)  T(F): 97.9 (2024 04:45), Max: 99.9 (2024 16:25)  HR: 75 (2024 04:45) (75 - 98)  BP: 136/85 (2024 04:45) (122/88 - 142/79)  BP(mean): --  RR: 16 (2024 04:45) (16 - 18)  SpO2: 91% (2024 04:45) (91% - 98%)    Parameters below as of 2024 04:45  Patient On (Oxygen Delivery Method): room air      CAPILLARY BLOOD GLUCOSE  117 (2024 19:36)      POCT Blood Glucose.: 117 mg/dL (2024 15:45)    I&O's Summary    2024 07:01  -  2024 07:00  --------------------------------------------------------  IN: 0 mL / OUT: 200 mL / NET: -200 mL        PHYSICAL EXAM:  GENERAL: NAD, well-developed  HEAD:  Atraumatic, Normocephalic  EYES: EOMI, PERRLA, conjunctiva and sclera clear  NECK: Supple, No JVD  CHEST/LUNG: Clear to auscultation bilaterally; No wheeze  HEART: Regular rate and rhythm; No murmurs, rubs, or gallops  ABDOMEN: Soft, Nontender, Nondistended; Bowel sounds present  EXTREMITIES:  2+ Peripheral Pulses, No clubbing, cyanosis, or edema  PSYCH: AAOx3  NEUROLOGY: non-focal  SKIN: No rashes or lesions    LABS:                        10.4   9.37  )-----------( 152      ( 2024 16:05 )             32.8     -18    134<L>  |  98  |  23  ----------------------------<  114<H>  4.4   |  23  |  0.92    Ca    6.2<LL>      2024 16:05  Phos  3.0     18  Mg     .8     18    TPro  6.3  /  Alb  3.6  /  TBili  1.0  /  DBili  x   /  AST  27  /  ALT  11  /  AlkPhos  89  -18    PT/INR - ( 2024 16:05 )   PT: 16.8 sec;   INR: 1.62 ratio         PTT - ( 2024 16:05 )  PTT:31.9 sec      Urinalysis Basic - ( 2024 16:25 )    Color: Dark Yellow / Appearance: Turbid / S.027 / pH: x  Gluc: x / Ketone: Trace mg/dL  / Bili: Small / Urobili: 1.0 mg/dL   Blood: x / Protein: 100 mg/dL / Nitrite: Negative   Leuk Esterase: Moderate / RBC: 38 /HPF /  /HPF   Sq Epi: x / Non Sq Epi: >36 /HPF / Bacteria: Too Numerous to count /HPF        RADIOLOGY & ADDITIONAL TESTS:    Imaging Personally Reviewed:    Consultant(s) Notes Reviewed:      Care Discussed with Consultants/Other Providers:

## 2024-02-19 NOTE — PHYSICAL THERAPY INITIAL EVALUATION ADULT - ADDITIONAL COMMENTS
Pt presents from [facility] where she required assistance with all mobility & ADLs. Pt was ambulatory short distances with RW prior to admit. Pt has w/c at [facility].

## 2024-02-19 NOTE — PHYSICAL THERAPY INITIAL EVALUATION ADULT - GENERAL OBSERVATIONS, REHAB EVAL
Chart reviewed events to date noted. Blood glucose reviewed. Pt tolerated 45min PT initial evaluation fairly well. Rec'd in bed in NAD, A&Ox1, pleasantly confused, [place], agreeable to PT.

## 2024-02-19 NOTE — PHYSICAL THERAPY INITIAL EVALUATION ADULT - GAIT DEVIATIONS NOTED, PT EVAL
decreased ruben/increased time in double stance/decreased step length/decreased stride length/decreased weight-shifting ability

## 2024-02-19 NOTE — PROGRESS NOTE ADULT - PROBLEM SELECTOR PLAN 5
Pt found to have Mag of 0.8 and Ca 5    -s/p Mag, Ca x1  -c/w repletion  -pending dysphagia screen can supplement mag PO

## 2024-02-19 NOTE — PHYSICAL THERAPY INITIAL EVALUATION ADULT - PERTINENT HX OF CURRENT PROBLEM, REHAB EVAL
89 yo F w/ PMHx of hypothyroidism, atrial fibrillation (not on A/C due to age), GERD, HLD presenting for change in speech and lethargy since this morning, as well as L knee pain/swelling, w/o acute changes on CT head, found to have UTI and electrolyte derangements, admitted for encephalopathy and L knee pain w/u.

## 2024-02-19 NOTE — PROGRESS NOTE ADULT - PROBLEM SELECTOR PLAN 10
DVT ppx: SCDs    Diet: NPO iso AMS, pending S&S    Dispo: TBA    Ethics: Full Code for now, son Ross is leaning towards DNR/DNI but will talk with his siblings

## 2024-02-19 NOTE — CONSULT NOTE ADULT - ASSESSMENT
89F no ac/ap, hx dementia (AOx1-2 baseline), hypothyroidism adm med s/p code stroke called for lethargy and word mumbling. Found to have UTI. CTH w/ no heme; diffuse atrophy causing hydrocephalus ex vacuo. CTA w/ incidentally found 5x5x5.8mm aneurysm at the L A2/A3 bifurcation. Exam: AOx2, PERRL, EOMI, PHILLIPS spon and strong. SILT.  - no acute neurosurgical intervention  - f/u w/ Dr. Darnell WASHINGTONN 89F no ac/ap, hx dementia (AOx1-2 baseline), hypothyroidism adm med s/p code stroke called for lethargy and word mumbling. Found to have UTI. CTH w/ no heme; diffuse atrophy causing hydrocephalus ex vacuo. CTA w/ incidentally found 5x5x5.8mm aneurysm at the L A2/A3 bifurcation. Exam: AOx2, PERRL, EOMI, PHILLIPS spon and strong. SILT.  - no acute neurosurgical intervention  - no neurosurgical follow up is warranted

## 2024-02-19 NOTE — CONSULT NOTE ADULT - SUBJECTIVE AND OBJECTIVE BOX
p (1480)     89F no ac/ap, hx dementia (AOx1-2 baseline), hypothyroidism adm med s/p code stroke called for lethargy and word mumbling. Found to have UTI. CTH w/ no heme; diffuse atrophy causing hydrocephalus ex vacuo. CTA w/ incidentally found 5x5x5.8mm aneurysm at the L A2/A3 bifurcation. Exam: AOx2, PERRL, EOMI, PHILLIPS spon and strong. SILT.    --Anticoagulation:  enoxaparin Injectable 40 milliGRAM(s) SubCutaneous every 24 hours    =====================  PAST MEDICAL HISTORY   Hyperlipidemia    GERD (gastroesophageal reflux disease)    Atrial fibrillation      PAST SURGICAL HISTORY   No significant past surgical history      No Known Allergies      MEDICATIONS:  Antibiotics:  azithromycin  IVPB 500 milliGRAM(s) IV Intermittent every 24 hours  cefTRIAXone   IVPB 1000 milliGRAM(s) IV Intermittent every 24 hours    Neuro:  ondansetron Injectable 4 milliGRAM(s) IV Push every 8 hours PRN    Other:      SOCIAL HISTORY:   Occupation:   Marital Status:     FAMILY HISTORY:  No pertinent family history in first degree relatives        ROS: Negative except per HPI    LABS:  PT/INR - ( 19 Feb 2024 07:26 )   PT: 13.8 sec;   INR: 1.33 ratio         PTT - ( 19 Feb 2024 07:26 )  PTT:30.6 sec                        10.5   8.50  )-----------( 136      ( 19 Feb 2024 07:26 )             33.2     02-19    136  |  98  |  18  ----------------------------<  101<H>  3.6   |  22  |  0.74    Ca    7.3<L>      19 Feb 2024 07:29  Phos  2.9     02-19  Mg     2.3     02-19    TPro  6.3  /  Alb  3.5  /  TBili  0.8  /  DBili  x   /  AST  16  /  ALT  9<L>  /  AlkPhos  93  02-19

## 2024-02-19 NOTE — PROGRESS NOTE ADULT - PROBLEM SELECTOR PLAN 1
Baseline A&Ox2, presently A&OX1 w/ garbled speech. Pt w/ change this morning, appearing more lethargic w/ change in speech. Ddx: infectious (UTI, septic knee joint?) vs. metabolic vs. neurologic     -code stroke called on 2/18, w/ nonlateralizing neuro exam  -CT head 2/18: no acute findings   -trend WBC, temp; elevated Neutrophil %  -BCx, UCx, UA+, CXR, RVP negative, MRSA  -s/p ceftriaxone, c/w ceftriaxone  -mag and calcium low  -trop 54->51, EKG   -neurology following  -Vitamin B1, B6, B12, Folate, Vitamin D 25OH, Vitamin E, TSH, FT4, Ammonia, CK  -if no clinical improvement after addressing above medical problems, consider EEG and/or MRI Brain w/wo IV contrast  -NSGY evaluation in AM given 5.5 x 5.0 x 5.8 mm aneurysm at the left A2 A3 bifurcation Baseline A&Ox2, presently A&OX1 w/ garbled speech. Pt w/ change this morning, appearing more lethargic w/ change in speech.     -Ddx: infectious (septic knee joint, pneumonia, UTI) vs. metabolic vs. electrolyte abnormality. Less likely neurologic   -code stroke called on 2/18, w/ nonlateralizing neuro exam  -CT head 2/18: no acute findings  -mag and calcium low, s/p repletions  -CXR shows right basilar pneumonia, possible aspiration  -UA high squamous cells  -RVP negative    Plan:   -ceftriaxone 1g for 5 days (2/18 -), azithromycin 500mg (2/19 -)  -f/u infectious workup BCx, UCx, MRSA, urine legionella  -f/u Vitamin B1, B6, B12, Folate, Vitamin D 25OH, Vitamin E, TSH, FT4, Ammonia, CK  -follow up neurology recs  -if no clinical improvement after addressing above medical problems, consider EEG and/or MRI Brain w/wo IV contrast  -NSGY evaluation in AM given 5.5 x 5.0 x 5.8 mm aneurysm at the left A2 A3 bifurcation

## 2024-02-19 NOTE — PHYSICAL THERAPY INITIAL EVALUATION ADULT - NSPTDISCHREC_GEN_A_CORE
Return to SNF with PT services. Resume assist with all mobility & ADLs. Pt has w/c, hospital bed, & RW./Long Term Care/SNF

## 2024-02-20 DIAGNOSIS — M25.469 EFFUSION, UNSPECIFIED KNEE: ICD-10-CM

## 2024-02-20 DIAGNOSIS — J18.9 PNEUMONIA, UNSPECIFIED ORGANISM: ICD-10-CM

## 2024-02-20 LAB
-  AMOXICILLIN/CLAVULANIC ACID: SIGNIFICANT CHANGE UP
-  AMPICILLIN/SULBACTAM: SIGNIFICANT CHANGE UP
-  AMPICILLIN: SIGNIFICANT CHANGE UP
-  AZTREONAM: SIGNIFICANT CHANGE UP
-  CEFAZOLIN: SIGNIFICANT CHANGE UP
-  CEFEPIME: SIGNIFICANT CHANGE UP
-  CEFOXITIN: SIGNIFICANT CHANGE UP
-  CEFTRIAXONE: SIGNIFICANT CHANGE UP
-  CEFUROXIME: SIGNIFICANT CHANGE UP
-  CIPROFLOXACIN: SIGNIFICANT CHANGE UP
-  ERTAPENEM: SIGNIFICANT CHANGE UP
-  GENTAMICIN: SIGNIFICANT CHANGE UP
-  IMIPENEM: SIGNIFICANT CHANGE UP
-  LEVOFLOXACIN: SIGNIFICANT CHANGE UP
-  MEROPENEM: SIGNIFICANT CHANGE UP
-  NITROFURANTOIN: SIGNIFICANT CHANGE UP
-  PIPERACILLIN/TAZOBACTAM: SIGNIFICANT CHANGE UP
-  TOBRAMYCIN: SIGNIFICANT CHANGE UP
-  TRIMETHOPRIM/SULFAMETHOXAZOLE: SIGNIFICANT CHANGE UP
ALBUMIN SERPL ELPH-MCNC: 3.1 G/DL — LOW (ref 3.3–5)
ALP SERPL-CCNC: 77 U/L — SIGNIFICANT CHANGE UP (ref 40–120)
ALT FLD-CCNC: 7 U/L — LOW (ref 10–45)
ANION GAP SERPL CALC-SCNC: 13 MMOL/L — SIGNIFICANT CHANGE UP (ref 5–17)
AST SERPL-CCNC: 18 U/L — SIGNIFICANT CHANGE UP (ref 10–40)
BILIRUB SERPL-MCNC: 0.5 MG/DL — SIGNIFICANT CHANGE UP (ref 0.2–1.2)
BUN SERPL-MCNC: 14 MG/DL — SIGNIFICANT CHANGE UP (ref 7–23)
CALCIUM SERPL-MCNC: 7.1 MG/DL — LOW (ref 8.4–10.5)
CHLORIDE SERPL-SCNC: 98 MMOL/L — SIGNIFICANT CHANGE UP (ref 96–108)
CO2 SERPL-SCNC: 23 MMOL/L — SIGNIFICANT CHANGE UP (ref 22–31)
CREAT SERPL-MCNC: 0.71 MG/DL — SIGNIFICANT CHANGE UP (ref 0.5–1.3)
CULTURE RESULTS: ABNORMAL
EGFR: 79 ML/MIN/1.73M2 — SIGNIFICANT CHANGE UP
GLUCOSE SERPL-MCNC: 93 MG/DL — SIGNIFICANT CHANGE UP (ref 70–99)
HCT VFR BLD CALC: 31.5 % — LOW (ref 34.5–45)
HGB BLD-MCNC: 9.7 G/DL — LOW (ref 11.5–15.5)
MAGNESIUM SERPL-MCNC: 2.3 MG/DL — SIGNIFICANT CHANGE UP (ref 1.6–2.6)
MCHC RBC-ENTMCNC: 30.8 GM/DL — LOW (ref 32–36)
MCHC RBC-ENTMCNC: 31.2 PG — SIGNIFICANT CHANGE UP (ref 27–34)
MCV RBC AUTO: 101.3 FL — HIGH (ref 80–100)
METHOD TYPE: SIGNIFICANT CHANGE UP
NRBC # BLD: 0 /100 WBCS — SIGNIFICANT CHANGE UP (ref 0–0)
ORGANISM # SPEC MICROSCOPIC CNT: ABNORMAL
ORGANISM # SPEC MICROSCOPIC CNT: ABNORMAL
PHOSPHATE SERPL-MCNC: 2.6 MG/DL — SIGNIFICANT CHANGE UP (ref 2.5–4.5)
PLATELET # BLD AUTO: 104 K/UL — LOW (ref 150–400)
POTASSIUM SERPL-MCNC: 3.9 MMOL/L — SIGNIFICANT CHANGE UP (ref 3.5–5.3)
POTASSIUM SERPL-SCNC: 3.9 MMOL/L — SIGNIFICANT CHANGE UP (ref 3.5–5.3)
PROT SERPL-MCNC: 5.7 G/DL — LOW (ref 6–8.3)
RBC # BLD: 3.11 M/UL — LOW (ref 3.8–5.2)
RBC # FLD: 16.8 % — HIGH (ref 10.3–14.5)
SODIUM SERPL-SCNC: 134 MMOL/L — LOW (ref 135–145)
SPECIMEN SOURCE: SIGNIFICANT CHANGE UP
WBC # BLD: 5.49 K/UL — SIGNIFICANT CHANGE UP (ref 3.8–10.5)
WBC # FLD AUTO: 5.49 K/UL — SIGNIFICANT CHANGE UP (ref 3.8–10.5)

## 2024-02-20 PROCEDURE — 99232 SBSQ HOSP IP/OBS MODERATE 35: CPT

## 2024-02-20 RX ORDER — CEFPODOXIME PROXETIL 100 MG
200 TABLET ORAL EVERY 12 HOURS
Refills: 0 | Status: DISCONTINUED | OUTPATIENT
Start: 2024-02-20 | End: 2024-02-21

## 2024-02-20 RX ORDER — AZITHROMYCIN 500 MG/1
500 TABLET, FILM COATED ORAL ONCE
Refills: 0 | Status: COMPLETED | OUTPATIENT
Start: 2024-02-21 | End: 2024-02-21

## 2024-02-20 RX ADMIN — AZITHROMYCIN 255 MILLIGRAM(S): 500 TABLET, FILM COATED ORAL at 11:38

## 2024-02-20 RX ADMIN — CHLORHEXIDINE GLUCONATE 1 APPLICATION(S): 213 SOLUTION TOPICAL at 11:39

## 2024-02-20 RX ADMIN — ENOXAPARIN SODIUM 40 MILLIGRAM(S): 100 INJECTION SUBCUTANEOUS at 11:39

## 2024-02-20 RX ADMIN — Medication 200 MILLIGRAM(S): at 17:21

## 2024-02-20 NOTE — ADVANCED PRACTICE NURSE CONSULT - REASON FOR CONSULT
Wound care consult initiated by RN to assess patient's skin for a possible deep tissue injury on sacrum and heels present on admission     Reason for Admission: AMS, garbled speech  History of Present Illness:   89 yo F w/ PMHx of hypothyroidism, atrial fibrillation (not on A/C due to age), GERD, HLD presenting for change in speech and lethargy since this morning. Per son Ross, pt uses a walker at baseline and was having a difficult time getting around yesterday and was not walking or eating much. Today, pt's son was called because pt woke up and looked terrible and grey, Pt's speech was garbled, sounded like mumbling, and pt was more lethargic, she also complained of L knee pain and it was more swollen than usual. Also w/ L shoulder pain. Her son came to the living facility with his kids and she was reorientable and her speech seemed to improve. She ate a cereal. After her son left, the facility stated that pt began to deteriorate again w/ mumbling speech and they said they were going to call an ambulance. Son stated that pt also had a cough last week which she was started on albuterol for and the cough completely resolved. At baseline, pt is A&Ox2, she is usually confused but pleasant w/ poor short term memory but able to be reoriented.    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 Pt also received Mag, calcium, and ceftriaxone.

## 2024-02-20 NOTE — SWALLOW BEDSIDE ASSESSMENT ADULT - ADDITIONAL RECOMMENDATIONS
Swallow: 1. Pt/family/caregiver will demonstrate understanding and carryover of dysphagia management (safe swallow guidelines, compensatory strategies, dysphagia diet).  2.Pt will tolerate recommended diet with no overt, clinical s/s of aspiration.

## 2024-02-20 NOTE — PROGRESS NOTE ADULT - PROBLEM SELECTOR PLAN 2
NSGY consulted, recommend no acute surgical intervention. Hx of recurrent UTIs, now present with another UTI    Plan:  -ceftriaxone switched to cefpodoxime

## 2024-02-20 NOTE — SWALLOW BEDSIDE ASSESSMENT ADULT - SLP GENERAL OBSERVATIONS
Patient received supine in bed, on room air, A&Ox1 (person). Patient hesitant upon encounter, but agreeable to PO trials.

## 2024-02-20 NOTE — PROGRESS NOTE ADULT - PROBLEM SELECTOR PLAN 8
Pt takes synthroid at home    -c/w home med INR of 1.62, PT of 16.8, not on home a/c.    -trend coags

## 2024-02-20 NOTE — PROGRESS NOTE ADULT - PROBLEM SELECTOR PLAN 5
Pt found to have Mag of 0.8 and Ca 5    -s/p Mag, Ca x1  -c/w repletion  -pending dysphagia screen can supplement mag PO NSGY consulted, recommend no acute surgical intervention.

## 2024-02-20 NOTE — SWALLOW BEDSIDE ASSESSMENT ADULT - COMMENTS
Code stroke- 2/18/24 @15:43  Failed RN administered Dysphagia screen 2/18/24 due to lethargy/unresponsiveness; on 2/20/24 upgraded to minced-moist/thins.  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 Pt also received Mag, calcium, and ceftriaxone.   Neurology impressions-Altered Mental Status with nonlateralizing neuro exam, CTH without acute pathology. AMS likely i/s/o toxic metabolic infectious processes such as UTI, hypomagnesemia, and hypocalcemia. Lower suspicion for primary neurologic process at this time.  Imaging:  CXR- Right basilar pneumonia. Trace left pleural effusion  CT PERFUSION: No focal perfusion deficit on visual assessment.  CT ANGIOGRAPHY NECK: No vessel narrowing or occlusion in the neck.  CT ANGIOGRAPHY HEAD: 5.5 x 5.0 x 5.8 mm aneurysm at the left A2 A3 bifurcation.  CT Brain-No acute intracranial findings.

## 2024-02-20 NOTE — PROGRESS NOTE ADULT - PROBLEM SELECTOR PLAN 4
Pt w/ worsening warmth and swelling of L knee and pain, started yesterday. Unknown hx of trauma. Also w/ complaint of L shoulder pain, however appears normal. Ddx of L knee: septic joint vs. gout vs. pseudogout    Plan:  -f/u knee xray Right basilar pneumonia seen on chest xray. Patient breathing normally on room air. Likely aspiration given history and location    Plan:  -cefpodoxime and azithromycin as above

## 2024-02-20 NOTE — PROGRESS NOTE ADULT - PROBLEM SELECTOR PLAN 6
Hgb of 10.4, MCV 97.9    -trend Hgb  -iron, B12, folate  -transfuse <7 Pt found to have Mag of 0.8 and Ca 5    -s/p Mag, Ca x1  -c/w repletion  -pending dysphagia screen can supplement mag PO

## 2024-02-20 NOTE — SWALLOW BEDSIDE ASSESSMENT ADULT - SWALLOW EVAL: DIAGNOSIS
89 yo F w/ PMHx of hypothyroidism, atrial fibrillation (not on A/C due to age), GERD, HLD presenting for change in speech and lethargy since this morning, as well as L knee pain/swelling, w/o acute changes on CT head, found to have UTI and electrolyte derangements, admitted for encephalopathy and L knee pain w/u. 89 yo F w/ PMHx of hypothyroidism, atrial fibrillation (not on A/C due to age), GERD, HLD presenting for change in speech and lethargy since this morning, as well as L knee pain/swelling, w/o acute changes on CT head, found to have UTI and electrolyte derangements. Patient with baseline wet vocal quality upon encounter noted to improve with cue for strong cough and re-swallow. Patient presenting with evidence of an tammi-pharyngeal dysphagia. Oral phase notable for prolonged and inefficient mastication on soft-bite sized textures, delayed oral transit, and reduced oral management for thin liquids. Pharyngeal phase notable for timely initiation of pharyngeal swallow with reduced hyo-laryngeal elevation upon palpation. Patient exhibited audible swallow and cough s/p trial of thin liquid via straw and cup suggesting laryngeal penetration/aspiration. No overt s/s of laryngeal penetration/aspiration noted on minced-moist and puree textures, or thickened liquids.

## 2024-02-20 NOTE — ADVANCED PRACTICE NURSE CONSULT - ASSESSMENT
Patient encountered on [address] [facility]. When wound care RN arrived on unit, patient was found lying in a low air loss pressure redistribution support surface style bed. Ms Garcia was alert and oriented and gave consent to skin consult, she is unable to turn independently and staff assistance x 1 was provided. Once turned, wound care RN was able to visualize an area of persistent nonblanchable deep erythema on B/L buttocks/sacral skin, area measures approximately 4cm x 4cm x 0cm- cannot rule out a deep tissue injury present on admission. On B/L heels there is dark erythema measuring approximately 3cm x 3cm x 0cm, cannot rule out a deep tissue injury present on admission. Once consult was complete, patient was educated regarding the need for routine turning and positioning to prevent pressure injuries and patient was placed in a left side-lying position utilizing pillow positioner assistive devices.

## 2024-02-20 NOTE — ADVANCED PRACTICE NURSE CONSULT - RECOMMEDATIONS
Impression:    fecal incontinence   urinary incontinence   B/L buttocks/sacral hyperpigmentation, cannot rule out a deep tissue injury present on admission  B/L heel hyperpigmentation, cannot rule out a deep tissue injury present on admission    Recommendations:    1) continue turning and positioning q2 and PRN utilizing offloading assistive devices  2) continue with routine pericare daily and PRN soiling  3) encourage optimal nutrition  4) waffle cushion when oob to chair  5) B/L LE complete cair air fluidized boots OR tal lock pillow to offload heels/feet  6) triad protective barrier cream to B/L buttocks/sacrum daily and PRN soiling  7) incontinence management - consider external urinary catheter to divert urine from skin if incontinent    Plan discussed with PALOMO Kellogg on unit    For questions or comments regarding this consult please call Symone at [phone]. Thank you.

## 2024-02-20 NOTE — PROGRESS NOTE ADULT - ASSESSMENT
89 yo F w/ PMHx of hypothyroidism, atrial fibrillation (not on A/C due to age), GERD, HLD presenting for change in speech and lethargy since this morning, as well as L knee pain/swelling, w/o acute changes on CT head, found to have UTI and electrolyte derangements, admitted for encephalopathy and L knee pain w/u. 89

## 2024-02-20 NOTE — PROGRESS NOTE ADULT - CONVERSATION DETAILS
Discussed with both the patient's son Ross who is the HCP and the patient's daughter Juanpablo. Based on the family's goals of care, the patient has a quality of life that they would like her to continue and would not want to go through aggressive interventions. No CPR. No shocks. No intubation. No dialysis. The family is okay with pressors if needed.

## 2024-02-20 NOTE — SWALLOW BEDSIDE ASSESSMENT ADULT - H & P REVIEW
;89 yo F w/ PMHx of hypothyroidism, atrial fibrillation (not on A/C due to age), GERD, HLD presenting for change in speech and lethargy since this morning. Per son Ross, pt uses a walker at baseline and was having a difficult time getting around yesterday and was not walking or eating much. Today, pt's son was called because pt woke up and looked terrible and grey, Pt's speech was garbled, sounded like mumbling, and pt was more lethargic, she also complained of L knee pain and it was more swollen than usual. Also w/ L shoulder pain. Her son came to the living facility with his kids and she was reorientable and her speech seemed to improve. She ate a cereal. After her son left, the facility stated that pt began to deteriorate again w/ mumbling speech and they said they were going to call an ambulance. Son stated that pt also had a cough last week which she was started on albuterol for and the cough completely resolved. At baseline, pt is A&Ox2, she is usually confused but pleasant w/ poor short term memory but able to be reoriented./yes

## 2024-02-20 NOTE — PROGRESS NOTE ADULT - PROBLEM SELECTOR PLAN 3
Hx of recurrent UTIs, now present with another UTI    Plan:  -c/w ceftriaxone 1g for 5 day course Pt w/ worsening warmth and swelling of L knee and pain, started yesterday. Unknown hx of trauma. Also w/ complaint of L shoulder pain, however appears normal. Ddx of L knee: septic joint vs. gout vs. pseudogout  -knee xray shows moderate knee effusion  Plan:  -rheumatology consult for right knee tap

## 2024-02-20 NOTE — PROGRESS NOTE ADULT - PROBLEM SELECTOR PLAN 7
INR of 1.62, PT of 16.8, not on home a/c.    -trend coags Hgb of 10.4, MCV 97.9    -trend Hgb  -iron, B12, folate  -transfuse <7

## 2024-02-20 NOTE — PROGRESS NOTE ADULT - SUBJECTIVE AND OBJECTIVE BOX
DATE OF SERVICE: 02-20-24 @ 07:37    Patient is a 89y old  Female who presents with a chief complaint of AMS, garbled speech (19 Feb 2024 15:19)      SUBJECTIVE / OVERNIGHT EVENTS:    MEDICATIONS  (STANDING):  azithromycin  IVPB 500 milliGRAM(s) IV Intermittent every 24 hours  cefTRIAXone   IVPB 1000 milliGRAM(s) IV Intermittent every 24 hours  chlorhexidine 4% Liquid 1 Application(s) Topical daily  enoxaparin Injectable 40 milliGRAM(s) SubCutaneous every 24 hours    MEDICATIONS  (PRN):  ondansetron Injectable 4 milliGRAM(s) IV Push every 8 hours PRN Nausea and/or Vomiting      Vital Signs Last 24 Hrs  T(C): 37.1 (20 Feb 2024 04:34), Max: 37.1 (20 Feb 2024 04:34)  T(F): 98.7 (20 Feb 2024 04:34), Max: 98.7 (20 Feb 2024 04:34)  HR: 70 (20 Feb 2024 04:34) (70 - 87)  BP: 127/62 (20 Feb 2024 04:34) (114/74 - 143/82)  BP(mean): --  RR: 16 (20 Feb 2024 04:34) (16 - 18)  SpO2: 95% (20 Feb 2024 04:34) (95% - 97%)    Parameters below as of 20 Feb 2024 04:34  Patient On (Oxygen Delivery Method): room air      CAPILLARY BLOOD GLUCOSE        I&O's Summary    19 Feb 2024 07:01  -  20 Feb 2024 07:00  --------------------------------------------------------  IN: 240 mL / OUT: 550 mL / NET: -310 mL        PHYSICAL EXAM:  GENERAL: NAD, well-developed  HEAD:  Atraumatic, Normocephalic  EYES: EOMI, PERRLA, conjunctiva and sclera clear  NECK: Supple, No JVD  CHEST/LUNG: Clear to auscultation bilaterally; No wheeze  HEART: Regular rate and rhythm; No murmurs, rubs, or gallops  ABDOMEN: Soft, Nontender, Nondistended; Bowel sounds present  EXTREMITIES:  2+ Peripheral Pulses, No clubbing, cyanosis, or edema  PSYCH: AAOx3  NEUROLOGY: non-focal  SKIN: No rashes or lesions    LABS:                        10.5   8.50  )-----------( 136      ( 19 Feb 2024 07:26 )             33.2     02-19    136  |  98  |  18  ----------------------------<  101<H>  3.6   |  22  |  0.74    Ca    7.3<L>      19 Feb 2024 07:29  Phos  2.9     02-19  Mg     2.3     02-19    TPro  6.3  /  Alb  3.5  /  TBili  0.8  /  DBili  x   /  AST  16  /  ALT  9<L>  /  AlkPhos  93  02-19    PT/INR - ( 19 Feb 2024 07:26 )   PT: 13.8 sec;   INR: 1.33 ratio         PTT - ( 19 Feb 2024 07:26 )  PTT:30.6 sec  CARDIAC MARKERS ( 19 Feb 2024 07:29 )  x     / x     / 168 U/L / x     / x          Urinalysis Basic - ( 19 Feb 2024 07:29 )    Color: x / Appearance: x / SG: x / pH: x  Gluc: 101 mg/dL / Ketone: x  / Bili: x / Urobili: x   Blood: x / Protein: x / Nitrite: x   Leuk Esterase: x / RBC: x / WBC x   Sq Epi: x / Non Sq Epi: x / Bacteria: x        RADIOLOGY & ADDITIONAL TESTS:    Imaging Personally Reviewed:    Consultant(s) Notes Reviewed:      Care Discussed with Consultants/Other Providers:   DATE OF SERVICE: 02-20-24 @ 07:37    Patient is a 89y old  Female who presents with a chief complaint of AMS, garbled speech (19 Feb 2024 15:19)      SUBJECTIVE / OVERNIGHT EVENTS: NAEO. Patient has no chest pain, SOB, N/V, abdominal pain.     MEDICATIONS  (STANDING):  azithromycin  IVPB 500 milliGRAM(s) IV Intermittent every 24 hours  cefTRIAXone   IVPB 1000 milliGRAM(s) IV Intermittent every 24 hours  chlorhexidine 4% Liquid 1 Application(s) Topical daily  enoxaparin Injectable 40 milliGRAM(s) SubCutaneous every 24 hours    MEDICATIONS  (PRN):  ondansetron Injectable 4 milliGRAM(s) IV Push every 8 hours PRN Nausea and/or Vomiting      Vital Signs Last 24 Hrs  T(C): 37.1 (20 Feb 2024 04:34), Max: 37.1 (20 Feb 2024 04:34)  T(F): 98.7 (20 Feb 2024 04:34), Max: 98.7 (20 Feb 2024 04:34)  HR: 70 (20 Feb 2024 04:34) (70 - 87)  BP: 127/62 (20 Feb 2024 04:34) (114/74 - 143/82)  BP(mean): --  RR: 16 (20 Feb 2024 04:34) (16 - 18)  SpO2: 95% (20 Feb 2024 04:34) (95% - 97%)    Parameters below as of 20 Feb 2024 04:34  Patient On (Oxygen Delivery Method): room air      CAPILLARY BLOOD GLUCOSE        I&O's Summary    19 Feb 2024 07:01  -  20 Feb 2024 07:00  --------------------------------------------------------  IN: 240 mL / OUT: 550 mL / NET: -310 mL        PHYSICAL EXAM:  GENERAL: NAD, well-developed  HEAD:  Atraumatic, Normocephalic  EYES: EOMI, conjunctiva and sclera clear  CHEST/LUNG: Clear to auscultation bilaterally; No wheeze  HEART: irregular rate, no murmurs  ABDOMEN: Soft, Nontender, Nondistended; Bowel sounds present  EXTREMITIES:  2+ Peripheral Pulses, No tenderness, edema, or erythema  PSYCH: AAOx1  NEUROLOGY: non-focal  SKIN: No rashes or lesions    LABS:                        10.5   8.50  )-----------( 136      ( 19 Feb 2024 07:26 )             33.2     02-19    136  |  98  |  18  ----------------------------<  101<H>  3.6   |  22  |  0.74    Ca    7.3<L>      19 Feb 2024 07:29  Phos  2.9     02-19  Mg     2.3     02-19    TPro  6.3  /  Alb  3.5  /  TBili  0.8  /  DBili  x   /  AST  16  /  ALT  9<L>  /  AlkPhos  93  02-19    PT/INR - ( 19 Feb 2024 07:26 )   PT: 13.8 sec;   INR: 1.33 ratio         PTT - ( 19 Feb 2024 07:26 )  PTT:30.6 sec  CARDIAC MARKERS ( 19 Feb 2024 07:29 )  x     / x     / 168 U/L / x     / x          Urinalysis Basic - ( 19 Feb 2024 07:29 )    Color: x / Appearance: x / SG: x / pH: x  Gluc: 101 mg/dL / Ketone: x  / Bili: x / Urobili: x   Blood: x / Protein: x / Nitrite: x   Leuk Esterase: x / RBC: x / WBC x   Sq Epi: x / Non Sq Epi: x / Bacteria: x        RADIOLOGY & ADDITIONAL TESTS:    Imaging Personally Reviewed:    Consultant(s) Notes Reviewed:      Care Discussed with Consultants/Other Providers:

## 2024-02-20 NOTE — SWALLOW BEDSIDE ASSESSMENT ADULT - NS ASR SWALLOW FINDINGS DISCUS
MD Rosas, PALOMO Kellogg, patient at bedside, and son Ross via phone./Physician/Nursing/Patient/Family

## 2024-02-20 NOTE — SWALLOW BEDSIDE ASSESSMENT ADULT - ORAL PREPARATORY PHASE
Reduced oral grading to cup/straw benefiting from clinician assistance. Significantly prolonged and inefficient mastication. Slow but adequate manipulation/mastication of textures.

## 2024-02-20 NOTE — PROGRESS NOTE ADULT - PROBLEM SELECTOR PLAN 10
DVT ppx: SCDs    Diet: NPO iso AMS, pending S&S    Dispo: TBA    Ethics: Full Code for now, son Ross is leaning towards DNR/DNI but will talk with his siblings Pt takes pantoprazole at home    -c/w home med

## 2024-02-20 NOTE — SWALLOW BEDSIDE ASSESSMENT ADULT - PHARYNGEAL PHASE
Timely initiation of pharyngeal swallow with reduced hyo-laryngeal elevation upon palpation. No overt s/s of laryngeal penetration/aspiration. Delayed initiation of pharyngeal swallow with reduced hyo-larygeal elevation and audible swallow suggesting of reduced coordination/increase rate of intake. Wet vocal quality noted s/p trials of thin liquids suggestive of laryngeal penetration/aspiration. Baseline vocal quality achieved s/p verbal cue to cough and re-swallow. Timely initiation of pharyngeal swallow with reduced hyo-laryngeal elevation upon palpation. No overt s/s of laryngeal penetration/aspiration observed.

## 2024-02-20 NOTE — PROGRESS NOTE ADULT - PROBLEM SELECTOR PLAN 1
Baseline A&Ox2, presently A&OX1 w/ garbled speech. Pt w/ change this morning, appearing more lethargic w/ change in speech.     -Ddx: infectious (pneumonia, UTI, septic joint) vs. metabolic vs. electrolyte abnormality. Less likely neurologic   -code stroke called on 2/18, w/ nonlateralizing neuro exam  -CT head 2/18: no acute findings  -mag and calcium low, s/p repletions  -CXR shows right basilar pneumonia, possible aspiration  -UA high squamous cells  -RVP negative    Plan:   -ceftriaxone 1g for 5 days (2/18 -), azithromycin 500mg (2/19 -)  -f/u infectious workup BCx, UCx, MRSA, urine legionella  -f/u Vitamin B1, B6, B12, Folate, Vitamin D 25OH, Vitamin E, TSH, FT4, Ammonia, CK  -follow up neurology recs  -if no clinical improvement after addressing above medical problems, consider EEG and/or MRI Brain w/wo IV contrast Baseline A&Ox2, presently A&OX1 w/ garbled speech. Pt w/ change this morning, appearing more lethargic w/ change in speech.     -Ddx: infectious (pneumonia, UTI, septic joint) vs. metabolic vs. electrolyte abnormality. Less likely neurologic   -code stroke called on 2/18, w/ nonlateralizing neuro exam  -CT head 2/18: no acute findings  -mag and calcium low, s/p repletions  -Urine culture positive for E Coli  -CXR shows right basilar pneumonia, possible aspiration  -RVP negative    Plan:   -ceftriaxone 1g for 5 days (2/18 -), azithromycin 500mg (2/19 -)  -f/u infectious workup BCx, UCx, MRSA, urine legionella  -f/u Vitamin B1, B6, B12, Folate, Vitamin D 25OH, Vitamin E, TSH, FT4, Ammonia, CK  -follow up neurology recs  -if no clinical improvement after addressing above medical problems, consider EEG and/or MRI Brain w/wo IV contrast

## 2024-02-21 LAB
ALBUMIN SERPL ELPH-MCNC: 3.4 G/DL — SIGNIFICANT CHANGE UP (ref 3.3–5)
ALBUMIN SERPL ELPH-MCNC: 3.5 G/DL — SIGNIFICANT CHANGE UP (ref 3.3–5)
ALP SERPL-CCNC: 85 U/L — SIGNIFICANT CHANGE UP (ref 40–120)
ALP SERPL-CCNC: 85 U/L — SIGNIFICANT CHANGE UP (ref 40–120)
ALT FLD-CCNC: 11 U/L — SIGNIFICANT CHANGE UP (ref 10–45)
ALT FLD-CCNC: 8 U/L — LOW (ref 10–45)
ANION GAP SERPL CALC-SCNC: 10 MMOL/L — SIGNIFICANT CHANGE UP (ref 5–17)
ANION GAP SERPL CALC-SCNC: 9 MMOL/L — SIGNIFICANT CHANGE UP (ref 5–17)
AST SERPL-CCNC: 12 U/L — SIGNIFICANT CHANGE UP (ref 10–40)
AST SERPL-CCNC: 15 U/L — SIGNIFICANT CHANGE UP (ref 10–40)
B PERT IGG+IGM PNL SER: ABNORMAL
BASOPHILS # BLD AUTO: 0 K/UL — SIGNIFICANT CHANGE UP (ref 0–0.2)
BASOPHILS # BLD AUTO: 0 K/UL — SIGNIFICANT CHANGE UP (ref 0–0.2)
BASOPHILS NFR BLD AUTO: 0 % — SIGNIFICANT CHANGE UP (ref 0–2)
BASOPHILS NFR BLD AUTO: 0 % — SIGNIFICANT CHANGE UP (ref 0–2)
BILIRUB SERPL-MCNC: 0.5 MG/DL — SIGNIFICANT CHANGE UP (ref 0.2–1.2)
BILIRUB SERPL-MCNC: 0.5 MG/DL — SIGNIFICANT CHANGE UP (ref 0.2–1.2)
BLD GP AB SCN SERPL QL: NEGATIVE — SIGNIFICANT CHANGE UP
BUN SERPL-MCNC: 8 MG/DL — SIGNIFICANT CHANGE UP (ref 7–23)
BUN SERPL-MCNC: 9 MG/DL — SIGNIFICANT CHANGE UP (ref 7–23)
CALCIUM SERPL-MCNC: 7.6 MG/DL — LOW (ref 8.4–10.5)
CALCIUM SERPL-MCNC: 8 MG/DL — LOW (ref 8.4–10.5)
CHLORIDE SERPL-SCNC: 96 MMOL/L — SIGNIFICANT CHANGE UP (ref 96–108)
CHLORIDE SERPL-SCNC: 99 MMOL/L — SIGNIFICANT CHANGE UP (ref 96–108)
CO2 SERPL-SCNC: 26 MMOL/L — SIGNIFICANT CHANGE UP (ref 22–31)
CO2 SERPL-SCNC: 29 MMOL/L — SIGNIFICANT CHANGE UP (ref 22–31)
COLOR FLD: YELLOW
CREAT SERPL-MCNC: 0.54 MG/DL — SIGNIFICANT CHANGE UP (ref 0.5–1.3)
CREAT SERPL-MCNC: 0.65 MG/DL — SIGNIFICANT CHANGE UP (ref 0.5–1.3)
EGFR: 82 ML/MIN/1.73M2 — SIGNIFICANT CHANGE UP
EGFR: 86 ML/MIN/1.73M2 — SIGNIFICANT CHANGE UP
EOSINOPHIL # BLD AUTO: 0.01 K/UL — SIGNIFICANT CHANGE UP (ref 0–0.5)
EOSINOPHIL # BLD AUTO: 0.01 K/UL — SIGNIFICANT CHANGE UP (ref 0–0.5)
EOSINOPHIL NFR BLD AUTO: 0.2 % — SIGNIFICANT CHANGE UP (ref 0–6)
EOSINOPHIL NFR BLD AUTO: 0.2 % — SIGNIFICANT CHANGE UP (ref 0–6)
FLUID INTAKE SUBSTANCE CLASS: SIGNIFICANT CHANGE UP
GAS PNL BLDA: SIGNIFICANT CHANGE UP
GLUCOSE BLDC GLUCOMTR-MCNC: 119 MG/DL — HIGH (ref 70–99)
GLUCOSE SERPL-MCNC: 103 MG/DL — HIGH (ref 70–99)
GLUCOSE SERPL-MCNC: 108 MG/DL — HIGH (ref 70–99)
HCT VFR BLD CALC: 30.7 % — LOW (ref 34.5–45)
HCT VFR BLD CALC: 33.2 % — LOW (ref 34.5–45)
HGB BLD-MCNC: 10.1 G/DL — LOW (ref 11.5–15.5)
HGB BLD-MCNC: 9.9 G/DL — LOW (ref 11.5–15.5)
IMM GRANULOCYTES NFR BLD AUTO: 0.8 % — SIGNIFICANT CHANGE UP (ref 0–0.9)
IMM GRANULOCYTES NFR BLD AUTO: 1.4 % — HIGH (ref 0–0.9)
LYMPHOCYTES # BLD AUTO: 0.71 K/UL — LOW (ref 1–3.3)
LYMPHOCYTES # BLD AUTO: 0.8 K/UL — LOW (ref 1–3.3)
LYMPHOCYTES # BLD AUTO: 15.9 % — SIGNIFICANT CHANGE UP (ref 13–44)
LYMPHOCYTES # BLD AUTO: 16.3 % — SIGNIFICANT CHANGE UP (ref 13–44)
MAGNESIUM SERPL-MCNC: 1.8 MG/DL — SIGNIFICANT CHANGE UP (ref 1.6–2.6)
MAGNESIUM SERPL-MCNC: 2 MG/DL — SIGNIFICANT CHANGE UP (ref 1.6–2.6)
MCHC RBC-ENTMCNC: 30.4 GM/DL — LOW (ref 32–36)
MCHC RBC-ENTMCNC: 30.9 PG — SIGNIFICANT CHANGE UP (ref 27–34)
MCHC RBC-ENTMCNC: 31.7 PG — SIGNIFICANT CHANGE UP (ref 27–34)
MCHC RBC-ENTMCNC: 32.2 GM/DL — SIGNIFICANT CHANGE UP (ref 32–36)
MCV RBC AUTO: 101.5 FL — HIGH (ref 80–100)
MCV RBC AUTO: 98.4 FL — SIGNIFICANT CHANGE UP (ref 80–100)
MONOCYTES # BLD AUTO: 0.42 K/UL — SIGNIFICANT CHANGE UP (ref 0–0.9)
MONOCYTES # BLD AUTO: 0.6 K/UL — SIGNIFICANT CHANGE UP (ref 0–0.9)
MONOCYTES NFR BLD AUTO: 12 % — SIGNIFICANT CHANGE UP (ref 2–14)
MONOCYTES NFR BLD AUTO: 9.6 % — SIGNIFICANT CHANGE UP (ref 2–14)
MONOS+MACROS # FLD: 29 % — SIGNIFICANT CHANGE UP
MRSA PCR RESULT.: SIGNIFICANT CHANGE UP
NEUTROPHILS # BLD AUTO: 3.16 K/UL — SIGNIFICANT CHANGE UP (ref 1.8–7.4)
NEUTROPHILS # BLD AUTO: 3.57 K/UL — SIGNIFICANT CHANGE UP (ref 1.8–7.4)
NEUTROPHILS NFR BLD AUTO: 71.1 % — SIGNIFICANT CHANGE UP (ref 43–77)
NEUTROPHILS NFR BLD AUTO: 72.5 % — SIGNIFICANT CHANGE UP (ref 43–77)
NEUTROPHILS-BODY FLUID: 71 % — SIGNIFICANT CHANGE UP
NRBC # BLD: 0 /100 WBCS — SIGNIFICANT CHANGE UP (ref 0–0)
NRBC # BLD: 0 /100 WBCS — SIGNIFICANT CHANGE UP (ref 0–0)
PHOSPHATE SERPL-MCNC: 2 MG/DL — LOW (ref 2.5–4.5)
PHOSPHATE SERPL-MCNC: 2 MG/DL — LOW (ref 2.5–4.5)
PLATELET # BLD AUTO: 138 K/UL — LOW (ref 150–400)
PLATELET # BLD AUTO: 141 K/UL — LOW (ref 150–400)
POTASSIUM SERPL-MCNC: 3.7 MMOL/L — SIGNIFICANT CHANGE UP (ref 3.5–5.3)
POTASSIUM SERPL-MCNC: 4.2 MMOL/L — SIGNIFICANT CHANGE UP (ref 3.5–5.3)
POTASSIUM SERPL-SCNC: 3.7 MMOL/L — SIGNIFICANT CHANGE UP (ref 3.5–5.3)
POTASSIUM SERPL-SCNC: 4.2 MMOL/L — SIGNIFICANT CHANGE UP (ref 3.5–5.3)
PROT SERPL-MCNC: 6.1 G/DL — SIGNIFICANT CHANGE UP (ref 6–8.3)
PROT SERPL-MCNC: 6.2 G/DL — SIGNIFICANT CHANGE UP (ref 6–8.3)
RBC # BLD: 3.12 M/UL — LOW (ref 3.8–5.2)
RBC # BLD: 3.27 M/UL — LOW (ref 3.8–5.2)
RBC # FLD: 16.6 % — HIGH (ref 10.3–14.5)
RBC # FLD: 16.6 % — HIGH (ref 10.3–14.5)
RCV VOL RI: 5000 CELLS/UL — HIGH (ref 0–5)
RH IG SCN BLD-IMP: POSITIVE — SIGNIFICANT CHANGE UP
S AUREUS DNA NOSE QL NAA+PROBE: DETECTED
SODIUM SERPL-SCNC: 134 MMOL/L — LOW (ref 135–145)
SODIUM SERPL-SCNC: 135 MMOL/L — SIGNIFICANT CHANGE UP (ref 135–145)
TOTAL NUCLEATED CELL COUNT, BODY FLUID: 3005 CELLS/UL — HIGH (ref 0–5)
TUBE TYPE: SIGNIFICANT CHANGE UP
WBC # BLD: 4.36 K/UL — SIGNIFICANT CHANGE UP (ref 3.8–10.5)
WBC # BLD: 5.02 K/UL — SIGNIFICANT CHANGE UP (ref 3.8–10.5)
WBC # FLD AUTO: 4.36 K/UL — SIGNIFICANT CHANGE UP (ref 3.8–10.5)
WBC # FLD AUTO: 5.02 K/UL — SIGNIFICANT CHANGE UP (ref 3.8–10.5)

## 2024-02-21 PROCEDURE — 99232 SBSQ HOSP IP/OBS MODERATE 35: CPT

## 2024-02-21 PROCEDURE — 20610 DRAIN/INJ JOINT/BURSA W/O US: CPT | Mod: LT

## 2024-02-21 PROCEDURE — 71045 X-RAY EXAM CHEST 1 VIEW: CPT | Mod: 26

## 2024-02-21 PROCEDURE — 99222 1ST HOSP IP/OBS MODERATE 55: CPT | Mod: GC,25

## 2024-02-21 RX ORDER — SODIUM CHLORIDE 9 MG/ML
500 INJECTION, SOLUTION INTRAVENOUS
Refills: 0 | Status: COMPLETED | OUTPATIENT
Start: 2024-02-21 | End: 2024-02-21

## 2024-02-21 RX ORDER — PIPERACILLIN AND TAZOBACTAM 4; .5 G/20ML; G/20ML
3.38 INJECTION, POWDER, LYOPHILIZED, FOR SOLUTION INTRAVENOUS ONCE
Refills: 0 | Status: COMPLETED | OUTPATIENT
Start: 2024-02-21 | End: 2024-02-21

## 2024-02-21 RX ORDER — PIPERACILLIN AND TAZOBACTAM 4; .5 G/20ML; G/20ML
3.38 INJECTION, POWDER, LYOPHILIZED, FOR SOLUTION INTRAVENOUS ONCE
Refills: 0 | Status: DISCONTINUED | OUTPATIENT
Start: 2024-02-22 | End: 2024-02-22

## 2024-02-21 RX ORDER — SODIUM,POTASSIUM PHOSPHATES 278-250MG
1 POWDER IN PACKET (EA) ORAL ONCE
Refills: 0 | Status: COMPLETED | OUTPATIENT
Start: 2024-02-21 | End: 2024-02-21

## 2024-02-21 RX ORDER — PIPERACILLIN AND TAZOBACTAM 4; .5 G/20ML; G/20ML
3.38 INJECTION, POWDER, LYOPHILIZED, FOR SOLUTION INTRAVENOUS EVERY 8 HOURS
Refills: 0 | Status: DISCONTINUED | OUTPATIENT
Start: 2024-02-22 | End: 2024-02-22

## 2024-02-21 RX ORDER — ACETAMINOPHEN 500 MG
1000 TABLET ORAL ONCE
Refills: 0 | Status: COMPLETED | OUTPATIENT
Start: 2024-02-21 | End: 2024-02-22

## 2024-02-21 RX ORDER — PIPERACILLIN AND TAZOBACTAM 4; .5 G/20ML; G/20ML
3.38 INJECTION, POWDER, LYOPHILIZED, FOR SOLUTION INTRAVENOUS ONCE
Refills: 0 | Status: COMPLETED | OUTPATIENT
Start: 2024-02-22 | End: 2024-02-22

## 2024-02-21 RX ORDER — ASCORBIC ACID 60 MG
500 TABLET,CHEWABLE ORAL DAILY
Refills: 0 | Status: DISCONTINUED | OUTPATIENT
Start: 2024-02-21 | End: 2024-02-24

## 2024-02-21 RX ADMIN — Medication 1 PACKET(S): at 12:25

## 2024-02-21 RX ADMIN — SODIUM CHLORIDE 250 MILLILITER(S): 9 INJECTION, SOLUTION INTRAVENOUS at 18:39

## 2024-02-21 RX ADMIN — ENOXAPARIN SODIUM 40 MILLIGRAM(S): 100 INJECTION SUBCUTANEOUS at 12:24

## 2024-02-21 RX ADMIN — AZITHROMYCIN 500 MILLIGRAM(S): 500 TABLET, FILM COATED ORAL at 10:33

## 2024-02-21 RX ADMIN — PIPERACILLIN AND TAZOBACTAM 200 GRAM(S): 4; .5 INJECTION, POWDER, LYOPHILIZED, FOR SOLUTION INTRAVENOUS at 22:37

## 2024-02-21 RX ADMIN — Medication 1 TABLET(S): at 18:22

## 2024-02-21 RX ADMIN — Medication 200 MILLIGRAM(S): at 18:22

## 2024-02-21 RX ADMIN — Medication 200 MILLIGRAM(S): at 05:46

## 2024-02-21 RX ADMIN — Medication 500 MILLIGRAM(S): at 18:22

## 2024-02-21 NOTE — PROGRESS NOTE ADULT - ASSESSMENT
Assessment/plan:    Early signs of bilateral heel DTI: Stable, present on admission, noninfected.    Recommend continue decubitus precautions and use of Z flow boots.  Recommend Cavilon to heels daily.  Reconsult podiatry as needed

## 2024-02-21 NOTE — STROKE CODE NOTE - PATIENT LAST KNOWN WELL_DATE TIME
Received pt VT of 12 9, Dr Jose Thrasher aware  Per Dr Jose Thrasher increase pt to Vancomycin 1250mg Iv Q8H continue weekly labs  Made [facility] aware and faxed order over 
21-Feb-2024
17-Feb-2024

## 2024-02-21 NOTE — PROGRESS NOTE ADULT - SUBJECTIVE AND OBJECTIVE BOX
Podiatry pager #: [phone]/ [phone]    Patient is a 89y old  Female who presents with a chief complaint of AMS, garbled speech (21 Feb 2024 07:13)Podiatry consultation for evaluation of bilateral heels      HPI:  89 yo F w/ PMHx of hypothyroidism, atrial fibrillation (not on A/C due to age), GERD, HLD presenting for change in speech and lethargy since this morning. Per son Ross, pt uses a walker at baseline and was having a difficult time getting around yesterday and was not walking or eating much. Today, pt's son was called because pt woke up and looked terrible and grey, Pt's speech was garbled, sounded like mumbling, and pt was more lethargic, she also complained of L knee pain and it was more swollen than usual. Also w/ L shoulder pain. Her son came to the living facility with his kids and she was reorientable and her speech seemed to improve. She ate a cereal. After her son left, the facility stated that pt began to deteriorate again w/ mumbling speech and they said they were going to call an ambulance. Son stated that pt also had a cough last week which she was started on albuterol for and the cough completely resolved. At baseline, pt is A&Ox2, she is usually confused but pleasant w/ poor short term memory but able to be reoriented.    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 Pt also received Mag, calcium, and ceftriaxone.  (18 Feb 2024 21:08)      PAST MEDICAL & SURGICAL HISTORY:  Hyperlipidemia      GERD (gastroesophageal reflux disease)      Atrial fibrillation      No significant past surgical history          MEDICATIONS  (STANDING):  azithromycin   Tablet 500 milliGRAM(s) Oral once  cefpodoxime 200 milliGRAM(s) Oral every 12 hours  chlorhexidine 4% Liquid 1 Application(s) Topical daily  enoxaparin Injectable 40 milliGRAM(s) SubCutaneous every 24 hours    MEDICATIONS  (PRN):  ondansetron Injectable 4 milliGRAM(s) IV Push every 8 hours PRN Nausea and/or Vomiting      Allergies    No Known Allergies    Intolerances        VITALS:    Vital Signs Last 24 Hrs  T(C): 36.6 (21 Feb 2024 08:10), Max: 37.1 (20 Feb 2024 08:59)  T(F): 97.8 (21 Feb 2024 08:10), Max: 98.7 (20 Feb 2024 08:59)  HR: 68 (21 Feb 2024 08:10) (68 - 85)  BP: 135/79 (21 Feb 2024 08:10) (112/65 - 139/79)  BP(mean): --  RR: 16 (21 Feb 2024 08:10) (16 - 18)  SpO2: 98% (21 Feb 2024 08:10) (95% - 98%)    Parameters below as of 21 Feb 2024 08:10  Patient On (Oxygen Delivery Method): room air        LABS:                          9.7    5.49  )-----------( 104      ( 20 Feb 2024 07:18 )             31.5       02-20    134<L>  |  98  |  14  ----------------------------<  93  3.9   |  23  |  0.71    Ca    7.1<L>      20 Feb 2024 07:22  Phos  2.6     02-20  Mg     2.3     02-20    TPro  5.7<L>  /  Alb  3.1<L>  /  TBili  0.5  /  DBili  x   /  AST  18  /  ALT  7<L>  /  AlkPhos  77  02-20      CAPILLARY BLOOD GLUCOSE              LOWER EXTREMITY PHYSICAL EXAM:    Vasular: DP/PT 1_/4, B/L, CFT <2seconds B/L, Temperature gradient wnl_, B/L.   Neuro: Epicritic sensation intact_ to the level of _toes, B/L.  Skin: Bilateral heels early signs of DTI: Blanchable erythema.  No bulla no purulence no fluctuance no malodor no clinical signs of infection or open ulcerations at this time.      RADIOLOGY & ADDITIONAL STUDIES:

## 2024-02-21 NOTE — DIETITIAN INITIAL EVALUATION ADULT - NSFNSADHERENCEPTAFT_GEN_A_CORE
Prior to admission, pt's son stated the pt followed a regular diet, no therapeutic restrictions. Pt's son stated the pt is missing teeth, and consumes softer foods at baseline. Pt's son stated pt typically eats 3 meals per day at the assisted living facility. Pt's son reported no recent changes in appetite or PO intake, stated pt eats whatever she is given typically, not a picky eater.    Per paper chart, pt noted to be ordered for a "Regular" diet. No known food allergies documented in paper chart.

## 2024-02-21 NOTE — DIETITIAN INITIAL EVALUATION ADULT - PERTINENT LABORATORY DATA
02-21    134<L>  |  96  |  9   ----------------------------<  108<H>  3.7   |  29  |  0.65    Ca    7.6<L>      21 Feb 2024 10:40  Phos  2.0     02-21  Mg     2.0     02-21    TPro  6.1  /  Alb  3.5  /  TBili  0.5  /  DBili  x   /  AST  15  /  ALT  11  /  AlkPhos  85  02-21

## 2024-02-21 NOTE — DIETITIAN INITIAL EVALUATION ADULT - OTHER CALCULATIONS
Estimated protein-energy needs calculated using current dosing weight of 133.4 pounds (2/18) with consideration for advanced age, deep tissue pressure injuries.   Defer fluid calculations to the medical team.

## 2024-02-21 NOTE — DIETITIAN INITIAL EVALUATION ADULT - PROBLEM SELECTOR PLAN 1
Baseline A&Ox2, presently A&OX1 w/ garbled speech. Pt w/ change this morning, appearing more lethargic w/ change in speech. Ddx: infectious (UTI, septic knee joint?) vs. metabolic vs. neurologic     -code stroke called on 2/18, w/ nonlateralizing neuro exam  -CT head 2/18: no acute findings   -trend WBC, temp; elevated Neutrophil %  -BCx, UCx, UA+, CXR, RVP negative, MRSA  -s/p ceftriaxone, c/w ceftriaxone  -mag and calcium low  -trop 54->51, EKG   -neurology following  -Vitamin B1, B6, B12, Folate, Vitamin D 25OH, Vitamin E, TSH, FT4, Ammonia, CK  -if no clinical improvement after addressing above medical problems, consider EEG and/or MRI Brain w/wo IV contrast  -NSGY evaluation in AM given 5.5 x 5.0 x 5.8 mm aneurysm at the left A2 A3 bifurcation

## 2024-02-21 NOTE — PROGRESS NOTE ADULT - PROBLEM SELECTOR PLAN 3
Pt w/ worsening warmth and swelling of L knee and pain, started yesterday. Unknown hx of trauma. Also w/ complaint of L shoulder pain, however appears normal. Ddx of L knee: septic joint vs. gout vs. pseudogout  -knee xray shows moderate knee effusion  Plan:  -rheumatology consult for right knee tap Pt w/ worsening warmth and swelling of L knee and pain, started yesterday. Unknown hx of trauma. Also w/ complaint of L shoulder pain, however appears normal. Ddx of L knee: septic joint vs. gout vs. pseudogout  -knee xray shows moderate knee effusion    Plan:  -rheumatology consult for right knee tap

## 2024-02-21 NOTE — CONSULT NOTE ADULT - SUBJECTIVE AND OBJECTIVE BOX
MAURI SILVA  [id number]    HPI: 89 yo F PMH A+Ox2 (baseline), recurrent UTI, hypothyroidism, atrial fibrillation (not on A/C due to age), GERD, HLD p/w 1 day acute change in speech and lethargy    HPI: Per notes, pt w/ 1 day acute AMS (A+Ox1 w/ "garbled speech) as well as L knee pain/swelling. Per son Ross, pt uses a walker at baseline and was having a difficult time getting around yesterday and was not walking or eating much. Today, pt's son was called because pt woke up and looked terrible and grey, Pt's speech was garbled, sounded like mumbling, and pt was more lethargic, she also complained of L knee pain and it was more swollen than usual. Also w/ L shoulder pain. Her son came to the living facility with his kids and she was reorientable and her speech seemed to improve. She ate a cereal. After her son left, the facility stated that pt began to deteriorate again w/ mumbling speech and they said they were going to call an ambulance. Son stated that pt also had a cough last week which she was started on albuterol for and the cough completely resolved. At baseline, pt is A&Ox2, she is usually confused but pleasant w/ poor short term memory but able to be reoriented.    Hospital Course: CTH negative. Found to have E coli UTI, low mag and calcium. CXR shows right basilar pneumonia, possible aspiration. Admitted for encephalopathy and L knee pain w/u. Received ceftriaxone 1g for 5 days (2/18 -), azithromycin 500mg (2/19 -). knee xray shows moderate knee effusion. Rheumatology consulted for knee pain/swelling.    Vitals: afebrile, VSS  Labs since admission: WBC 9 to 5, ESR 38, , hgb 9.7, ,  to 104, creatinine 0.7, Iron 9%, UA moderate leuk esterase/103WBC, >100,000 E coli,   Imaging:  -US legs negative for DVT       Meds: synthroid, PPI  OA, crystal        MEDICATIONS  (STANDING):  cefpodoxime 200 milliGRAM(s) Oral every 12 hours  chlorhexidine 4% Liquid 1 Application(s) Topical daily  enoxaparin Injectable 40 milliGRAM(s) SubCutaneous every 24 hours    MEDICATIONS  (PRN):  ondansetron Injectable 4 milliGRAM(s) IV Push every 8 hours PRN Nausea and/or Vomiting      Allergies    No Known Allergies    Intolerances        PERTINENT MEDICATION HISTORY:    MEDICATIONS:    ALLERGIES:    SURGERIES:    HOSPITALIZATIONS:    FAMILY HISTORY:    TRAVEL HISTORY:    SOCIAL HISTORY:    FAMILY HISTORY:  No pertinent family history in first degree relatives        Vital Signs Last 24 Hrs  T(C): 36.6 (21 Feb 2024 08:10), Max: 37 (20 Feb 2024 12:05)  T(F): 97.8 (21 Feb 2024 08:10), Max: 98.6 (20 Feb 2024 12:05)  HR: 68 (21 Feb 2024 08:10) (68 - 85)  BP: 135/79 (21 Feb 2024 08:10) (112/65 - 135/79)  BP(mean): --  RR: 16 (21 Feb 2024 08:10) (16 - 18)  SpO2: 98% (21 Feb 2024 08:10) (96% - 98%)    Parameters below as of 21 Feb 2024 08:10  Patient On (Oxygen Delivery Method): room air        Physical Exam:  General: Breathing comfortably on room air, no distress  HEENT: EOMI, MMM, no oral ulcers  CVS: +S1/S2, RRR  Resp: CTA b/l. No crackles/wheezing  GI: Soft, NT/ND +BS  MSK: 5/5 muscle strength in arms and legs. B/l shoulder, elbow, wrist, MCP/PIP/DIP, Knee, ankle w/o swelling/erythema/or tenderness  Skin: no visible rashes    LABS:                        10.1   4.36  )-----------( 141      ( 21 Feb 2024 10:38 )             33.2     02-20    134<L>  |  98  |  14  ----------------------------<  93  3.9   |  23  |  0.71    Ca    7.1<L>      20 Feb 2024 07:22  Phos  2.6     02-20  Mg     2.3     02-20    TPro  5.7<L>  /  Alb  3.1<L>  /  TBili  0.5  /  DBili  x   /  AST  18  /  ALT  7<L>  /  AlkPhos  77  02-20      Urinalysis Basic - ( 20 Feb 2024 07:22 )    Color: x / Appearance: x / SG: x / pH: x  Gluc: 93 mg/dL / Ketone: x  / Bili: x / Urobili: x   Blood: x / Protein: x / Nitrite: x   Leuk Esterase: x / RBC: x / WBC x   Sq Epi: x / Non Sq Epi: x / Bacteria: x        RADIOLOGY & ADDITIONAL STUDIES: Reviewed     MAURI SILVA  [id number]    HPI: 89 yo F PMH A+Ox2 (baseline), recurrent UTI, hypothyroidism, atrial fibrillation (not on A/C due to age), GERD, HLD p/w 1 day acute change in speech and lethargy    HPI: Per notes, pt w/ 1 day acute AMS (A+Ox1 w/ "garbled speech) as well as L knee pain/swelling. Per son Rsos, pt uses a walker at baseline and was having a difficult time getting around day of admission and was not walking or eating much. Pt woke up and looked terrible and grey, Pt's speech was garbled, sounded like mumbling, and pt was more lethargic, she also complained of L knee pain and it was more swollen than usual. Also w/ L shoulder pain. Her son came to the living facility with his kids and she was reorientable and her speech seemed to improve. She ate a cereal. After her son left, the facility stated that pt began to deteriorate again w/ mumbling speech and they said they were going to call an ambulance. Son stated that pt also had a cough last week which she was started on albuterol for and the cough completely resolved. At baseline, pt is A&Ox2, she is usually confused but pleasant w/ poor short term memory but able to be reoriented.    Hospital Course: CTH negative. Found to have E coli UTI, low mag and calcium. CXR shows right basilar pneumonia, possible aspiration. Admitted for encephalopathy and L knee pain w/u. Received ceftriaxone 1g for 5 days (2/18 -), azithromycin 500mg (2/19 -). knee xray shows moderate knee effusion. Rheumatology consulted for knee pain/swelling.    Vitals: afebrile, VSS  Labs since admission: WBC 9 to 5, ESR 38, , hgb 9.7, ,  to 104, creatinine 0.7, Iron 9%, UA moderate leuk esterase/103WBC, >100,000 E coli,   Imaging:  -US legs negative for DVT       Subjective: Pt A+Ox1 (self), says she feels well. Denies pain at rest. Has some pain when she tries to bend L knee.    MEDICATIONS  (STANDING):  cefpodoxime 200 milliGRAM(s) Oral every 12 hours  chlorhexidine 4% Liquid 1 Application(s) Topical daily  enoxaparin Injectable 40 milliGRAM(s) SubCutaneous every 24 hours    MEDICATIONS  (PRN):  ondansetron Injectable 4 milliGRAM(s) IV Push every 8 hours PRN Nausea and/or Vomiting      Allergies    No Known Allergies    Intolerances     FAMILY HISTORY:  No pertinent family history in first degree relatives        Vital Signs Last 24 Hrs  T(C): 36.6 (21 Feb 2024 08:10), Max: 37 (20 Feb 2024 12:05)  T(F): 97.8 (21 Feb 2024 08:10), Max: 98.6 (20 Feb 2024 12:05)  HR: 68 (21 Feb 2024 08:10) (68 - 85)  BP: 135/79 (21 Feb 2024 08:10) (112/65 - 135/79)  BP(mean): --  RR: 16 (21 Feb 2024 08:10) (16 - 18)  SpO2: 98% (21 Feb 2024 08:10) (96% - 98%)    Parameters below as of 21 Feb 2024 08:10  Patient On (Oxygen Delivery Method): room air        Physical Exam:  General: Breathing comfortably on room air, no distress  MSK: extensive b/l quang/heberdens nodes and squaring of hands, b/l knee effusion L>R. R knee w/ mild warmth, intact ROM, some tenderness to palpation. L knee w/ significant swelling, some warmth, some tenderness to palpation, no overlying erythema, significant limited ROM - only able to bend 10-20 degrees before pain starts  Skin: no visible rashes    LABS:                        10.1   4.36  )-----------( 141      ( 21 Feb 2024 10:38 )             33.2     02-20    134<L>  |  98  |  14  ----------------------------<  93  3.9   |  23  |  0.71    Ca    7.1<L>      20 Feb 2024 07:22  Phos  2.6     02-20  Mg     2.3     02-20    TPro  5.7<L>  /  Alb  3.1<L>  /  TBili  0.5  /  DBili  x   /  AST  18  /  ALT  7<L>  /  AlkPhos  77  02-20      Urinalysis Basic - ( 20 Feb 2024 07:22 )    Color: x / Appearance: x / SG: x / pH: x  Gluc: 93 mg/dL / Ketone: x  / Bili: x / Urobili: x   Blood: x / Protein: x / Nitrite: x   Leuk Esterase: x / RBC: x / WBC x   Sq Epi: x / Non Sq Epi: x / Bacteria: x        RADIOLOGY & ADDITIONAL STUDIES: Reviewed    < from: Xray Knee 3 Views, Left (02.19.24 @ 20:41) >    ACC: [id number] EXAM:  XR KNEE AP LAT OBL 3 VIEWS LT   ORDERED BY:  DAY CHAVEZ     PROCEDURE DATE:  02/19/2024          INTERPRETATION:  Left knee    HISTORY: Pain and swelling     Three views of the left knee show advanced arthritic changes with   moderate joint effusion projected above the patella. The joint spaces   show narrowing of the medial compartment and the patellofemoral space.    IMPRESSION: No acute bony pathology.        Thank you for this referral.    --- End of Report ---            CONNOR GOODWIN MD; Attending Interventional Radiologist  This document has been electronically signed. Feb 20 2024  1:06PM    < end of copied text >   MAURI SILVA  [id number]    HPI: 89 yo F PMH A+Ox2 (baseline), recurrent UTI, hypothyroidism, atrial fibrillation (not on A/C due to age), GERD, HLD p/w 1 day acute change in speech and lethargy    HPI: Per notes, pt w/ 1 day acute AMS (A+Ox1 w/ "garbled speech) as well as L knee pain/swelling. Per son Ross, pt uses a walker at baseline and was having a difficult time getting around day of admission and was not walking or eating much. Pt woke up and looked terrible and grey, Pt's speech was garbled, sounded like mumbling, and pt was more lethargic, she also complained of L knee pain and it was more swollen than usual. Also w/ L shoulder pain. Her son came to the living facility with his kids and she was reorientable and her speech seemed to improve. She ate a cereal. After her son left, the facility stated that pt began to deteriorate again w/ mumbling speech and they said they were going to call an ambulance. Son stated that pt also had a cough last week which she was started on albuterol for and the cough completely resolved. At baseline, pt is A&Ox2, she is usually confused but pleasant w/ poor short term memory but able to be reoriented.    Hospital Course: CTH negative. Found to have E coli UTI, low mag and calcium. CXR shows right basilar pneumonia, possible aspiration. Admitted for encephalopathy and L knee pain w/u. Received ceftriaxone 1g for 5 days (2/18 -), azithromycin 500mg (2/19 -). knee xray shows moderate knee effusion. Rheumatology consulted for knee pain/swelling.    Vitals: afebrile, VSS  Labs since admission: WBC 9 to 5, ESR 38, , hgb 9.7, ,  to 104, creatinine 0.7, Iron 9%, UA moderate leuk esterase/103WBC, >100,000 E coli,   Imaging:  -US legs negative for DVT       Subjective: Pt A+Ox1 (self), says she feels well. Denies pain at rest. Has some pain when she tries to bend L knee.    MEDICATIONS  (STANDING):  cefpodoxime 200 milliGRAM(s) Oral every 12 hours  chlorhexidine 4% Liquid 1 Application(s) Topical daily  enoxaparin Injectable 40 milliGRAM(s) SubCutaneous every 24 hours    MEDICATIONS  (PRN):  ondansetron Injectable 4 milliGRAM(s) IV Push every 8 hours PRN Nausea and/or Vomiting      Allergies    No Known Allergies    Intolerances     FAMILY HISTORY:  No pertinent family history in first degree relatives        Vital Signs Last 24 Hrs  T(C): 36.6 (21 Feb 2024 08:10), Max: 37 (20 Feb 2024 12:05)  T(F): 97.8 (21 Feb 2024 08:10), Max: 98.6 (20 Feb 2024 12:05)  HR: 68 (21 Feb 2024 08:10) (68 - 85)  BP: 135/79 (21 Feb 2024 08:10) (112/65 - 135/79)  BP(mean): --  RR: 16 (21 Feb 2024 08:10) (16 - 18)  SpO2: 98% (21 Feb 2024 08:10) (96% - 98%)    Parameters below as of 21 Feb 2024 08:10  Patient On (Oxygen Delivery Method): room air        Physical Exam:  General: Breathing comfortably on room air, no distress  MSK: extensive b/l quang/heberdens nodes and squaring of CMC joints bilaterally  , b/l knee effusion L>R. R knee w/ mild warmth, intact ROM, some tenderness to palpation. L knee w/ significant swelling, some warmth, some tenderness to palpation, no overlying erythema, significant limited ROM - only able to bend 10-20 degrees before pain starts  Skin: no visible rashes    LABS:                        10.1   4.36  )-----------( 141      ( 21 Feb 2024 10:38 )             33.2     02-20    134<L>  |  98  |  14  ----------------------------<  93  3.9   |  23  |  0.71    Ca    7.1<L>      20 Feb 2024 07:22  Phos  2.6     02-20  Mg     2.3     02-20    TPro  5.7<L>  /  Alb  3.1<L>  /  TBili  0.5  /  DBili  x   /  AST  18  /  ALT  7<L>  /  AlkPhos  77  02-20      Urinalysis Basic - ( 20 Feb 2024 07:22 )    Color: x / Appearance: x / SG: x / pH: x  Gluc: 93 mg/dL / Ketone: x  / Bili: x / Urobili: x   Blood: x / Protein: x / Nitrite: x   Leuk Esterase: x / RBC: x / WBC x   Sq Epi: x / Non Sq Epi: x / Bacteria: x        RADIOLOGY & ADDITIONAL STUDIES: Reviewed    < from: Xray Knee 3 Views, Left (02.19.24 @ 20:41) >    ACC: [id number] EXAM:  XR KNEE AP LAT OBL 3 VIEWS LT   ORDERED BY:  DAY CHAVEZ     PROCEDURE DATE:  02/19/2024          INTERPRETATION:  Left knee    HISTORY: Pain and swelling     Three views of the left knee show advanced arthritic changes with   moderate joint effusion projected above the patella. The joint spaces   show narrowing of the medial compartment and the patellofemoral space.    IMPRESSION: No acute bony pathology.        Thank you for this referral.    --- End of Report ---            CONNOR GOODWIN MD; Attending Interventional Radiologist  This document has been electronically signed. Feb 20 2024  1:06PM    < end of copied text >

## 2024-02-21 NOTE — DIETITIAN INITIAL EVALUATION ADULT - REASON
RD unable to perform nutrition focused physical exam at this time as pt is confused, unable to provide consent, no family present at bedside.

## 2024-02-21 NOTE — CONSULT NOTE ADULT - ATTENDING COMMENTS
Incidental unruptured aneurysm. Due to very advanced age, no intervention or follow up is warranted.
Agree with fellow's assessment and plan as above.  Patient aware of our recommendations and in agreement.  Discussed with primary team  will follow
DOS 2/19  code stroke called on arrival and neurology emergently assessed patinet   Briefly   89F with dementia (~AAOX1-2 at baseline), presents as code stroke for lethargy and mumbling. LKW 6:30 PM on 2/17/2024. Per son, pt was noted to be lethargic, not getting out of bed, and mumbling her words as of 7 AM. RoS potentially limited due to mental status, but pt denying focal weakness, numbness/tingling, HA, visual changes. No AC/AP. Uses walker/wheelchair at baseline. In ED T 99.9 oral, SBPs 130s-140s, HR 90s, satting 96 on RA, found to have Ca 6.2, Mg 0.8, and UA c/f UTI (Bacteria too numerous to count, moderate LE, 103 WBCs).   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    UA+  CRP elevated   o/e AAOx1 PHILLIPS non focal     Impression:   1) Altered Mental Status  likely i/s/o toxic metabolic infectious processes such as UTI, hypomagnesemia, and hypocalcemia. Lower suspicion for primary neurologic process at this time.   2) incidental 5.5x5x5.8mm aneurysm     Recommendations:  - Toxic metabolic workup: Infectious workup (BCx, UCx, UA, CXR, RVP+COVID), CBC, CMP, Mg, Phos, Vitamin B1, B6, B12, Folate, Vitamin D 25OH, Vitamin E, TSH, FT4, Ammonia, Lactate, CK,   - If no clinical improvement after addressing above medical problems, consider EEG and/or MRI Brain w/wo IV contrast  -  NSGY evaluation given 5.5 x 5.0 x 5.8 mm aneurysm at the left A2 A3 bifurcation.   - b12, RPR< TSH if not already checked   - _PT/OT   - further workup if no improvement after treatment of infection   - check FS, glucose control <180  - GI/DVT ppx  - Counseling on diet, exercise, and medication adherence was done  - Counseling on smoking cessation and alcohol consumption offered when appropriate.  - Pain assessed and judicious use of narcotics when appropriate was discussed.    - Stroke education given when appropriate.  - Importance of fall prevention discussed.   - Differential diagnosis and plan of care discussed with patient and/or family and primary team  - Thank you for allowing me to participate in the care of this patient. Call with questions.   Papa Garcia MD  Vascular Neurology  Office: [phone]

## 2024-02-21 NOTE — DIETITIAN INITIAL EVALUATION ADULT - REASON INDICATOR FOR ASSESSMENT
RD consult warranted for Pressure Injury Stage 2 or >  Source: Patient, Patient's Son (Ross) via Telephone Call, Paper Chart, Registered Nurse, Electronic Medical Record  Chart reviewed, events noted.

## 2024-02-21 NOTE — PROGRESS NOTE ADULT - PROBLEM SELECTOR PLAN 4
Right basilar pneumonia seen on chest xray. Patient breathing normally on room air. Likely aspiration given history and location    Plan:  -cefpodoxime and azithromycin as above

## 2024-02-21 NOTE — DIETITIAN INITIAL EVALUATION ADULT - OTHER INFO
Height: No height noted in paper chart or [facility] HIE. Per pt's son, pt is 5 feet 2 inches (used for BMI).     Weights:  - UBW: Both patient and patient's son unsure of pt's UBW at this time. Pt's son endorsed no recent significant changes based upon visual observation.   - Dosing Weight (per chart): 133.4 pounds (2/18) (per chart)  - Daily Weights (per flow sheets): No daily weights noted to assess at this time (per chart).   - Bed Scale Weight (taken by RD): 144.5 pounds (question accuracy, multiple items on bed, pt in boots) (2/21)  - Per [facility] HIE: 133.5 pounds (2/18)  - Per Paper Chart: 123 pounds (2/5/24)  Weight discrepancies noted. Pt's son reported no recent significant weight changes based upon visual observation, and endorsed no changes in appetite or PO intake prior to admission. RD to continue to monitor weights and trends as available/able.     GOC conversation held on 2/20, "No CPR. No shocks. No intubation. No dialysis. The family is okay with pressors if needed."  Speech Language Pathologist evaluation on 2/20, recommended "Minced-moist and mildly thickened liquids via small sips/bites; 100% supervision."    Pt with encephalopathy (per chart).   - Pt noted to be baseline AAOx2 (per chart).  - Per chart, possibly "infectious (pneumonia, UTI, septic joint) vs. metabolic vs. electrolyte abnormality. Less likely neurologic."  - Code stroke called on 2/18 with "nonlateralizing neuro exam" (per chart).  - CT head on 2/18 with no acute findings (per chart).  -Per Neurosurgery note on 2/19, "CTA w/ incidentally found 5x5x5.8mm aneurysm at the L A2/A3 bifurcation."    Pt with UTI (per chart).  - Ordered for antibiotics in-house (per orders).     Pt with pneumonia (per chart).  - Per chart, " Right basilar pneumonia seen on chest xray... Likely aspiration given history and location."    Pt with electrolyte imbalance (per chart).  - Noted to previously have low magnesium and calcium in-house. S/p repletion (per chart).     Labs:  - Sodium and phosphorous low today (2/21) (per chart). Pt is s/p potassium phosphate/sodium phosphate powder (per orders) on 2/21.

## 2024-02-21 NOTE — DIETITIAN INITIAL EVALUATION ADULT - NSFNSNUTRCHEWSWALLOWFT_GEN_A_CORE
Pt seen for a Bedside Swallow Evaluation on 2/20 (per chart), recommended "Minced-moist and mildly thickened liquids via small sips/bites; 100% supervision." Defer diet texture/consistency to Speech Language Pathologist/Medical Team.

## 2024-02-21 NOTE — DIETITIAN INITIAL EVALUATION ADULT - NSFNSGIIOFT_GEN_A_CORE
Per RN, pt is not experiencing any nausea, vomiting, diarrhea or constipation at this time.   - Note: Pt ordered for Zofran (per orders).   Last documented BM: 2/19 (per flow sheets)  Bowel Regimen: Pt not currently ordered for a bowel regimen at this time (per orders).

## 2024-02-21 NOTE — DIETITIAN INITIAL EVALUATION ADULT - PERSON TAUGHT/METHOD
Educated pt's son, Ross, via telephone call. Provided education on high-protein diet for wound healing and preservation of lean body mass. Encouraged consumption of HBV foods, prioritizing protein foods first at meal times, and importance of  meeting adequate protein-energy needs. Encouraged consumption of nutrient dense snacks between meals to optimize PO intake. Pt's son receptive to education, aware RD remains available./verbal instruction/son instructed

## 2024-02-21 NOTE — DIETITIAN INITIAL EVALUATION ADULT - NSFNSNUTRHOMESUPPLEMENTFT_GEN_A_CORE
Per patient's son, pt took Calcium with Vitamin D prior to admission. Pt's son stated pt did not consume any oral nutrition supplements prior to admission. No micronutrient supplementation or oral nutrition supplements noted in paper chart (per chart).

## 2024-02-21 NOTE — DIETITIAN INITIAL EVALUATION ADULT - ENERGY INTAKE
Fair (50-75%) Currently in-house, pt noted to be eating 51-75% of lunch on 2/19, and 26-50% of dinner on 2/19. Per RN, pt ate fairly well at breakfast this morning, had 100% of oatmeal, did not consume scrambled eggs (did not like them). RD to update preference in chart. RD spoke with pt, pt reported good appetite and PO intake at this time. RD offered to obtain food preferences to optimize PO intake, pt politely declined at this time. RD offered to provide an oral nutrition supplement to optimize protein-energy intake, pt declined at this time. RD attempted to obtain food preferences from pt's son via telephone call, pt's son stated pt is "easy to please," declined preferences at this time. Pt and son aware RD remains available to update preferences and add supplement upon request.

## 2024-02-21 NOTE — RAPID RESPONSE TEAM SUMMARY - NSSITUATIONBACKGROUNDRRT_GEN_ALL_CORE
89 yo F w/ PMHx of hypothyroidism, atrial fibrillation (not on A/C due to age), GERD, HLD presenting for change in speech and lethargy since this morning, as well as L knee pain/swelling, w/o acute changes on CT head, found to have UTI and electrolyte derangements, admitted for encephalopathy and L knee pain w/u. Rapid called for AMS.

## 2024-02-21 NOTE — PROGRESS NOTE ADULT - PROBLEM SELECTOR PLAN 1
Baseline A&Ox2, presently A&OX1 w/ garbled speech. Pt w/ change this morning, appearing more lethargic w/ change in speech.     -Ddx: infectious (pneumonia, UTI, septic joint) vs. metabolic vs. electrolyte abnormality. Less likely neurologic   -code stroke called on 2/18, w/ nonlateralizing neuro exam  -CT head 2/18: no acute findings  -mag and calcium low, s/p repletions  -Urine culture positive for E Coli  -CXR shows right basilar pneumonia, possible aspiration  -RVP negative    Plan:   -ceftriaxone 1g for 5 days (2/18 -), azithromycin 500mg (2/19 -)  -f/u infectious workup BCx, UCx, MRSA, urine legionella  -f/u Vitamin B1, B6, B12, Folate, Vitamin D 25OH, Vitamin E, TSH, FT4, Ammonia, CK  -follow up neurology recs  -if no clinical improvement after addressing above medical problems, consider EEG and/or MRI Brain w/wo IV contrast Baseline A&Ox2, presently A&OX1 w/ garbled speech. Pt w/ change this morning, appearing more lethargic w/ change in speech.     -Ddx: infectious (pneumonia, UTI, septic joint) vs. metabolic vs. electrolyte abnormality. Less likely neurologic   -code stroke called on 2/18, w/ nonlateralizing neuro exam  -CT head 2/18: no acute findings  -mag and calcium low, s/p repletions  -Urine culture positive for E Coli  -CXR shows right basilar pneumonia, possible aspiration    Plan:   -PT re-evaluation for disposition planning  -ceftriaxone 1g for 5 days (2/18 -), azithromycin 500mg (2/19 -)  -f/u Vitamin B1, B6, B12, Folate, Vitamin D 25OH, Vitamin E, TSH, FT4, Ammonia, CK  -follow up neurology recs  -on minced and moist diet with mildly thickened liquids

## 2024-02-21 NOTE — CONSULT NOTE ADULT - ASSESSMENT
89 yo F PMH A+Ox2 (baseline), recurrent UTI, hypothyroidism, atrial fibrillation (not on A/C due to age), GERD, HLD p/w 1 day acute change in speech and lethargy found to have UTI and pneumonia w/ improvement in mental status w/ abx. Rheumatology consulted for L knee swelling    #acute oligoarthritis:   =pt found to have L knee swelling on day of admission  =on exam, b/l knee effusion L>R. L knee w/ significant swelling, however no significant pain and low suspicion for septic arthritis. Significant limited ROM in L knee 2/2 effusion. Pt also w/ extensive signs of OA in hands.  =x-ray L knee w/ large effusion and signs of OA  =L knee effusion likely 2/2 OA flare vs crystal (suspect CPPD>gout)  =2/21 consent obtained from son, s/p 75cc straw colored fluid obtained and sent to lab for cell count/crystal/gram stain     Plan:  -follow up cell count/crystal/gram stain sent  -pt w/ improved ROM in L knee s/p arthrocentesis  -will decide if patient needs further treatment based on exam tomorrow  -c/w PT     Discussed with Attending Dr. Barrington Calle, DO  Rheumatology Fellow  Available on TEAMS

## 2024-02-21 NOTE — PROGRESS NOTE ADULT - ASSESSMENT
89F with dementia (~AAOX1-2 at baseline), presents as code stroke for lethargy and mumbling. LKW 6:30 PM on 2/17/2024. Per son, pt was noted to be lethargic, not getting out of bed, and mumbling her words as of 7 AM. RoS potentially limited due to mental status, but pt denying focal weakness, numbness/tingling, HA, visual changes. No AC/AP. Uses walker/wheelchair at baseline. In ED T 99.9 oral, SBPs 130s-140s, HR 90s, satting 96 on RA, found to have Ca 6.2, Mg 0.8, and UA c/f UTI (Bacteria too numerous to count, moderate LE, 103 WBCs).   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    UA+  CRP elevated   o/e AAOx1 PHILLIPS non focal     Impression:   1) Altered Mental Status  likely i/s/o toxic metabolic infectious processes such as UTI, hypomagnesemia, and hypocalcemia. Lower suspicion for primary neurologic process at this time.   2) incidental 5.5x5x5.8mm aneurysm     Recommendations:  - rheum eval for possible tap   - Toxic metabolic workup: Infectious workup    - If no clinical improvement after addressing above medical problems, consider EEG and/or MRI Brain w/wo IV contrast  -  NSGY evaluation given 5.5 x 5.0 x 5.8 mm aneurysm at the left A2 A3 bifurcation.  --> no intervention   - b12, RPR< TSH if not already checked   - _PT/OT   - further workup if no improvement after treatment of infection   - check FS, glucose control <180  - GI/DVT ppx   Papa Garcia MD  Vascular Neurology  Office: [phone] .      Patient presents for Degarlex injection today.     I am an RN. Does the patient have an active anti-cancer treatment plan? (oral, injection, or IV)   Yes.    ECOG:   ECOG [06/06/23 1121]   ECOG Performance Status 1       Oral Chemotherapy: Yes, Patient is taking medication as prescribed.  Nursing Assessment:  A comprehensive nursing assessment was performed and the patient reports the following:    Anxiety/Depression/Insomnia: Anxiety: No, Depression: No and Insomnia: No  Pain: NO - pt reports pain in L leg, intermittent, is relieved by activity and movement. Pt will bring up in appointment with Dr. Sanchez    Toxicity Assessment    Auditory/Ear  Assessment: Yes (w/ Exceptions to WDL)  Hearing Impaired: Grade 1 - see row information    Cardiac General  Assessment: Yes (w/ Exceptions to WDL)  Hypertension: Grade 2    Constitutional  Assessment: Yes (w/ Exceptions to WDL)  Fatigue: Grade 1  Insomnia: Grade 1    Dermatology/Skin  Assessment: Yes (Within Defined Limits)    Endocrine  Assessment: Yes (w/ Exceptions to WDL)  Hot Flashes: Grade 1    Gastrointestinal  Assessment: Yes (Within Defined Limits)    Hemorrhage/Bleeding  Assessment: Yes (Within Defined Limits)    Infection  Assessment: Yes (Within Defined Limits)    Lymphatics  Assessment: Yes (Within Defined Limits)    Musculoskeletal  Assessment: Yes (Within Defined Limits)    Neurology  Assessment: Yes (Within Defined Limits)    Ocular  Assessment: Yes (Within Defined Limits)    Pain  Assessment: Yes (Within Defined Limits)    Pulmonary/Upper Respiratory  Assessment: Yes (Within Defined Limits)    Genitourinary  Assessment: Yes (Within Defined Limits)        Patient has valid pre-authorization, VS completed, Lab results checked - MD notified and Patient instructed to call the office with any questions or concerns    Reviewed and verified Advanced Directives: Yes: Advance Directive is in chart     Pre-Injection Information: Allergies reviewed as required for  injection type., Pt states feeling well, no complaints. and Pt denies signs and symptoms of infection.    Refer to MAR (medication administration record) for type of injection and medication given.  Needle Size: prepackaged  Patient tolerated well: Stable

## 2024-02-21 NOTE — DIETITIAN INITIAL EVALUATION ADULT - SIGNS/SYMPTOMS
as evidenced by pt meeting <75% EER x 3 days in-house.  as evidenced by pt with deep tissue pressure injuries.

## 2024-02-21 NOTE — STROKE CODE NOTE - ACTIVATED STROKE TEAM
Chief Complaint   Patient presents with     RECHECK PE TUBES     S/P Myringotomy 10/23/17       Initial /74 (Cuff Size: Adult Large)  Pulse 83  Temp 97.3  F (36.3  C) (Tympanic)  Ht 6' (1.829 m)  Wt 260 lb (117.9 kg)  SpO2 98%  BMI 35.26 kg/m2 Estimated body mass index is 35.26 kg/(m^2) as calculated from the following:    Height as of this encounter: 6' (1.829 m).    Weight as of this encounter: 260 lb (117.9 kg).  Medication Reconciliation: complete    Edda Irwin LPN    
Yes
Yes

## 2024-02-21 NOTE — DIETITIAN INITIAL EVALUATION ADULT - WEIGHT (KG)
Problem: Adult Inpatient Plan of Care  Goal: Plan of Care Review  Outcome: Ongoing (interventions implemented as appropriate)  Pt on RA with documented sats.  Will continue to monitor.         60.5

## 2024-02-21 NOTE — PROGRESS NOTE ADULT - SUBJECTIVE AND OBJECTIVE BOX
DATE OF SERVICE: 02-21-24 @ 07:13    Patient is a 89y old  Female who presents with a chief complaint of AMS, garbled speech (20 Feb 2024 07:36)      SUBJECTIVE / OVERNIGHT EVENTS:    MEDICATIONS  (STANDING):  azithromycin   Tablet 500 milliGRAM(s) Oral once  cefpodoxime 200 milliGRAM(s) Oral every 12 hours  chlorhexidine 4% Liquid 1 Application(s) Topical daily  enoxaparin Injectable 40 milliGRAM(s) SubCutaneous every 24 hours    MEDICATIONS  (PRN):  ondansetron Injectable 4 milliGRAM(s) IV Push every 8 hours PRN Nausea and/or Vomiting      Vital Signs Last 24 Hrs  T(C): 36.4 (21 Feb 2024 00:40), Max: 37.1 (20 Feb 2024 08:59)  T(F): 97.5 (21 Feb 2024 00:40), Max: 98.7 (20 Feb 2024 08:59)  HR: 70 (21 Feb 2024 00:40) (70 - 85)  BP: 117/57 (21 Feb 2024 00:40) (112/65 - 139/79)  BP(mean): --  RR: 18 (21 Feb 2024 00:40) (18 - 18)  SpO2: 96% (21 Feb 2024 00:40) (95% - 98%)    Parameters below as of 21 Feb 2024 00:40  Patient On (Oxygen Delivery Method): room air      CAPILLARY BLOOD GLUCOSE        I&O's Summary      PHYSICAL EXAM:  GENERAL: NAD, well-developed  HEAD:  Atraumatic, Normocephalic  EYES: EOMI, PERRLA, conjunctiva and sclera clear  NECK: Supple, No JVD  CHEST/LUNG: Clear to auscultation bilaterally; No wheeze  HEART: Regular rate and rhythm; No murmurs, rubs, or gallops  ABDOMEN: Soft, Nontender, Nondistended; Bowel sounds present  EXTREMITIES:  2+ Peripheral Pulses, No clubbing, cyanosis, or edema  PSYCH: AAOx3  NEUROLOGY: non-focal  SKIN: No rashes or lesions    LABS:                        9.7    5.49  )-----------( 104      ( 20 Feb 2024 07:18 )             31.5     02-20    134<L>  |  98  |  14  ----------------------------<  93  3.9   |  23  |  0.71    Ca    7.1<L>      20 Feb 2024 07:22  Phos  2.6     02-20  Mg     2.3     02-20    TPro  5.7<L>  /  Alb  3.1<L>  /  TBili  0.5  /  DBili  x   /  AST  18  /  ALT  7<L>  /  AlkPhos  77  02-20    PT/INR - ( 19 Feb 2024 07:26 )   PT: 13.8 sec;   INR: 1.33 ratio         PTT - ( 19 Feb 2024 07:26 )  PTT:30.6 sec  CARDIAC MARKERS ( 19 Feb 2024 07:29 )  x     / x     / 168 U/L / x     / x          Urinalysis Basic - ( 20 Feb 2024 07:22 )    Color: x / Appearance: x / SG: x / pH: x  Gluc: 93 mg/dL / Ketone: x  / Bili: x / Urobili: x   Blood: x / Protein: x / Nitrite: x   Leuk Esterase: x / RBC: x / WBC x   Sq Epi: x / Non Sq Epi: x / Bacteria: x        RADIOLOGY & ADDITIONAL TESTS:    Imaging Personally Reviewed:    Consultant(s) Notes Reviewed:      Care Discussed with Consultants/Other Providers:   DATE OF SERVICE: 02-21-24 @ 07:13    Patient is a 89y old  Female who presents with a chief complaint of AMS, garbled speech (20 Feb 2024 07:36)      SUBJECTIVE / OVERNIGHT EVENTS: NAEO. Has no chest pain, difficulty breathing, dizziness, nausea/vomiting. Has urinated and having bowel movements.    MEDICATIONS  (STANDING):  azithromycin   Tablet 500 milliGRAM(s) Oral once  cefpodoxime 200 milliGRAM(s) Oral every 12 hours  chlorhexidine 4% Liquid 1 Application(s) Topical daily  enoxaparin Injectable 40 milliGRAM(s) SubCutaneous every 24 hours    MEDICATIONS  (PRN):  ondansetron Injectable 4 milliGRAM(s) IV Push every 8 hours PRN Nausea and/or Vomiting      Vital Signs Last 24 Hrs  T(C): 36.4 (21 Feb 2024 00:40), Max: 37.1 (20 Feb 2024 08:59)  T(F): 97.5 (21 Feb 2024 00:40), Max: 98.7 (20 Feb 2024 08:59)  HR: 70 (21 Feb 2024 00:40) (70 - 85)  BP: 117/57 (21 Feb 2024 00:40) (112/65 - 139/79)  BP(mean): --  RR: 18 (21 Feb 2024 00:40) (18 - 18)  SpO2: 96% (21 Feb 2024 00:40) (95% - 98%)    Parameters below as of 21 Feb 2024 00:40  Patient On (Oxygen Delivery Method): room air      CAPILLARY BLOOD GLUCOSE        I&O's Summary      PHYSICAL EXAM:  GENERAL: NAD, well-developed  HEAD:  Atraumatic, Normocephalic  EYES: EOMI, conjunctiva and sclera clear  CHEST/LUNG: Clear to auscultation bilaterally; No wheeze  HEART: irregular rate, no murmurs  ABDOMEN: Soft, Nontender, Nondistended; Bowel sounds present  EXTREMITIES:  2+ Peripheral Pulses, No tenderness, edema, or erythema  PSYCH: AAOx1-2  NEUROLOGY: non-focal  SKIN: No rashes or lesions    LABS:                        9.7    5.49  )-----------( 104      ( 20 Feb 2024 07:18 )             31.5     02-20    134<L>  |  98  |  14  ----------------------------<  93  3.9   |  23  |  0.71    Ca    7.1<L>      20 Feb 2024 07:22  Phos  2.6     02-20  Mg     2.3     02-20    TPro  5.7<L>  /  Alb  3.1<L>  /  TBili  0.5  /  DBili  x   /  AST  18  /  ALT  7<L>  /  AlkPhos  77  02-20    PT/INR - ( 19 Feb 2024 07:26 )   PT: 13.8 sec;   INR: 1.33 ratio         PTT - ( 19 Feb 2024 07:26 )  PTT:30.6 sec  CARDIAC MARKERS ( 19 Feb 2024 07:29 )  x     / x     / 168 U/L / x     / x          Urinalysis Basic - ( 20 Feb 2024 07:22 )    Color: x / Appearance: x / SG: x / pH: x  Gluc: 93 mg/dL / Ketone: x  / Bili: x / Urobili: x   Blood: x / Protein: x / Nitrite: x   Leuk Esterase: x / RBC: x / WBC x   Sq Epi: x / Non Sq Epi: x / Bacteria: x        RADIOLOGY & ADDITIONAL TESTS:    Imaging Personally Reviewed:    Consultant(s) Notes Reviewed:      Care Discussed with Consultants/Other Providers:

## 2024-02-21 NOTE — DIETITIAN INITIAL EVALUATION ADULT - ADD RECOMMEND
1) Recommend continue diet free of therapeutic restrictions as tolerated. Defer diet texture/consistency to Speech Language Pathologist/Medical Team.   2) Recommend adding Multivitamin and Vitamin C daily for wound healing and micronutrient coverage.   3) Monitor and encourage adequate PO intake, RD obtained food preferences to optimize PO intake, will honor as able. Pt declined oral nutrition supplements at this time, RD to honor pt's preference.   4) Monitor electrolytes, replete as needed. Monitor sodium and phosphorous.   5) Monitor nutritional intake, tolerance to diet prescription, bowel movements, weights, labs, and skin integrity.  1) Recommend continue diet free of therapeutic restrictions as tolerated. Defer diet texture/consistency to Speech Language Pathologist/Medical Team.   2) Recommend adding Multivitamin and Vitamin C daily for wound healing and micronutrient coverage unless contraindicated.   3) Monitor and encourage adequate PO intake, RD obtained food preferences to optimize PO intake, will honor as able. Pt declined oral nutrition supplements at this time, RD to honor pt's preference.   4) Monitor electrolytes, replete as needed. Monitor sodium and phosphorous.   5) Monitor nutritional intake, tolerance to diet prescription, bowel movements, weights, labs, and skin integrity.

## 2024-02-21 NOTE — STROKE CODE NOTE - DISPOSITION
RRT initially called for decreased responsiveness. Patient had initially presented on 2/17 with AMS and slurred speech, code stroke imaging was unrevealing. Only noted to have incidental 5.8mm aneursym. Patient was being treated for encephalopathy possibly 2/2 UTI, PNA. Also has L knee swelling s/p arthrocentesis today. Per rapid team, she had increased oxygen requirements. Upon assessment patient arousable to sternal rub and reported she was in pain, PERRLA, moving all extremities. Code stroke cancelled as patient appeared at baseline mental status, patient to remain on 5M, will be followed by Neurology attending Dr. Garcia/Other

## 2024-02-21 NOTE — DIETITIAN INITIAL EVALUATION ADULT - ETIOLOGY
related to increased physiological demand for nutrients  related to inability to meet sufficient protein-energy needs in setting of advanced age, altered mental status

## 2024-02-21 NOTE — PROGRESS NOTE ADULT - PROBLEM SELECTOR PLAN 2
Hx of recurrent UTIs, now present with another UTI    Plan:  -ceftriaxone switched to cefpodoxime Hx of recurrent UTIs, now present with another UTI    Plan:  -ceftriaxone switched to cefpodoxime, EOT on 02/23

## 2024-02-21 NOTE — PROCEDURE NOTE - NSINFORMCONSENT_GEN_A_CORE
consent obtained from HCP son/Benefits, risks, and possible complications of procedure explained to patient/caregiver who verbalized understanding and gave written consent.

## 2024-02-21 NOTE — DIETITIAN INITIAL EVALUATION ADULT - NSFNSPHYEXAMSKINFT_GEN_A_CORE
Per Wound Care Note on 2/20:  "B/L buttocks/sacral hyperpigmentation, cannot rule out a deep tissue injury present on admission  B/L heel hyperpigmentation, cannot rule out a deep tissue injury present on admission."    Per Podiatry Note on 2/21:  "Early signs of bilateral heel DTI: Stable, present on admission, noninfected."

## 2024-02-21 NOTE — DIETITIAN INITIAL EVALUATION ADULT - PERTINENT MEDS FT
MEDICATIONS  (STANDING):  cefpodoxime 200 milliGRAM(s) Oral every 12 hours  chlorhexidine 4% Liquid 1 Application(s) Topical daily  enoxaparin Injectable 40 milliGRAM(s) SubCutaneous every 24 hours  potassium phosphate / sodium phosphate Powder (PHOS-NaK) 1 Packet(s) Oral once    MEDICATIONS  (PRN):  ondansetron Injectable 4 milliGRAM(s) IV Push every 8 hours PRN Nausea and/or Vomiting

## 2024-02-21 NOTE — DIETITIAN INITIAL EVALUATION ADULT - ORAL INTAKE PTA/DIET HISTORY
Nutrition assessment completed at bedside. Subjective information obtained from pt's son via telephone call (Ross) and through paper chart review, as pt noted to be confused (per RN), and has Dementia (per chart). Pt noted to be previously from [facility]. Per pt's son, pt is a "light eater" at baseline. Reported no recent changes in appetite or PO intake. Pt's son unsure of pt's UBW at this time, however endorsed no recent significant weight changes upon visual observation. Confirmed no known food allergies.

## 2024-02-21 NOTE — DIETITIAN INITIAL EVALUATION ADULT - NS FNS DIET ORDER
Diet, Regular:   Minced and Moist (MINCEDMOIST)  Mildly Thick Liquids (MILDTHICKLIQS) (02-20-24 @ 11:59) [Active]

## 2024-02-21 NOTE — PROGRESS NOTE ADULT - SUBJECTIVE AND OBJECTIVE BOX
Neurology Progress Note    S: Patient seen and examined. No new events overnight. patient denied CP, SOB, HA or pain.     Medication:  ascorbic acid 500 milliGRAM(s) Oral daily  cefpodoxime 200 milliGRAM(s) Oral every 12 hours  chlorhexidine 4% Liquid 1 Application(s) Topical daily  enoxaparin Injectable 40 milliGRAM(s) SubCutaneous every 24 hours  multivitamin 1 Tablet(s) Oral daily  ondansetron Injectable 4 milliGRAM(s) IV Push every 8 hours PRN      Vitals:  Vital Signs Last 24 Hrs  T(C): 36.8 (21 Feb 2024 20:45), Max: 36.8 (21 Feb 2024 12:22)  T(F): 98.2 (21 Feb 2024 20:45), Max: 98.2 (21 Feb 2024 12:22)  HR: 85 (21 Feb 2024 20:45) (68 - 85)  BP: 163/87 (21 Feb 2024 20:45) (117/57 - 163/87)  BP(mean): --  RR: 20 (21 Feb 2024 20:45) (16 - 20)  SpO2: 96% (21 Feb 2024 20:45) (93% - 98%)    Parameters below as of 21 Feb 2024 20:45  Patient On (Oxygen Delivery Method): room air        General Exam:   General Appearance: Appropriately dressed and in no acute distress       Head: Normocephalic, atraumatic and no dysmorphic features  Ear, Nose, and Throat: Moist mucous membranes  CVS: S1S2+  Resp: No SOB, no wheeze or rhonchi  Abd: soft NTND  Extremities: No edema, no cyanosis  Skin: No bruises, no rashes     Neurological Exam:    Mental status - Lethargic but arousable to LT/voice, attends to examiner. Follows simple commands but struggles with complex ones. Language fluent with intact comprehension and repetition, but speech slurred (of note, pt without dentures). Poor concentration, registration of word, fund of knowledge. AAOx1-2    Cranial nerves - PERRL, VFF, EOMI, face sensation (V1-V3) intact b/l, facial strength intact without asymmetry b/l, trapezius 5/5 strength b/l, tongue midline on protrusion    Motor - Normal bulk and tone throughout. Maintains extremities antigravity without drift. Unable to perform manual motor strength testing because unable to follow commands.    Sensation - Sensation intact to LT and pain in all extremities    DTR's -             Biceps      Triceps     Brachioradialis      Patellar    Ankle    Toes/plantar response  R             0             0                    0                 0             0                   Mute  L             0             0                    0                 0             0                   Mute    Coordination - No dysmetria on FTN b/l    Gait and station - VIOLA      I personally reviewed the below data/images/labs:      CBC Full  -  ( 21 Feb 2024 10:38 )  WBC Count : 4.36 K/uL  RBC Count : 3.27 M/uL  Hemoglobin : 10.1 g/dL  Hematocrit : 33.2 %  Platelet Count - Automated : 141 K/uL  Mean Cell Volume : 101.5 fl  Mean Cell Hemoglobin : 30.9 pg  Mean Cell Hemoglobin Concentration : 30.4 gm/dL  Auto Neutrophil # : 3.16 K/uL  Auto Lymphocyte # : 0.71 K/uL  Auto Monocyte # : 0.42 K/uL  Auto Eosinophil # : 0.01 K/uL  Auto Basophil # : 0.00 K/uL  Auto Neutrophil % : 72.5 %  Auto Lymphocyte % : 16.3 %  Auto Monocyte % : 9.6 %  Auto Eosinophil % : 0.2 %  Auto Basophil % : 0.0 %    02-21    134<L>  |  96  |  9   ----------------------------<  108<H>  3.7   |  29  |  0.65    Ca    7.6<L>      21 Feb 2024 10:40  Phos  2.0     02-21  Mg     2.0     02-21    TPro  6.1  /  Alb  3.5  /  TBili  0.5  /  DBili  x   /  AST  15  /  ALT  11  /  AlkPhos  85  02-21    LIVER FUNCTIONS - ( 21 Feb 2024 10:40 )  Alb: 3.5 g/dL / Pro: 6.1 g/dL / ALK PHOS: 85 U/L / ALT: 11 U/L / AST: 15 U/L / GGT: x             Urinalysis Basic - ( 21 Feb 2024 10:40 )    Color: x / Appearance: x / SG: x / pH: x  Gluc: 108 mg/dL / Ketone: x  / Bili: x / Urobili: x   Blood: x / Protein: x / Nitrite: x   Leuk Esterase: x / RBC: x / WBC x   Sq Epi: x / Non Sq Epi: x / Bacteria: x

## 2024-02-21 NOTE — RAPID RESPONSE TEAM SUMMARY - NSADDTLFINDINGSRRT_GEN_ALL_CORE
Upon arrival NF resident and I noticed that pt became not talkative (last known talk was 930pm). Then called rapid to call code stroke. When neuro team arrived, pt was stimulated and became agitated and then code stroke cancelled. Considering increased O2 requirement w/ new AMS started sepsis workup and upgraded abx. ABG at rapid showed no lactate and good O2 sat. Ordered non-urgent CXR to f/u. DDx include delirium vs sepsis not fully treated (pt on PO abx, could have aspirated?). Rapid ended after sepsis workup initiated and abg came back. Upon arrival NF resident and I noticed that pt became not talkative (last known talk was 930pm). Then called rapid to call code stroke. When neuro team arrived, pt was stimulated and became agitated, talked again, and then code stroke cancelled. VSS otherwise. Rectally afebrile. Considering increased O2 requirement (From RA to 2L NC) w/ new AMS started sepsis workup and upgraded abx. ABG at rapid showed no lactate and good O2 sat. Ordered non-urgent CXR to f/u. DDx for AMS include delirium vs sepsis not fully treated (pt on PO abx, could have aspirated?). Rapid ended after sepsis workup initiated and abg came back.

## 2024-02-22 LAB
A-TOCOPHEROL VIT E SERPL-MCNC: 9.5 MG/L — SIGNIFICANT CHANGE UP (ref 9–29)
ANION GAP SERPL CALC-SCNC: 11 MMOL/L — SIGNIFICANT CHANGE UP (ref 5–17)
APTT BLD: 34 SEC — SIGNIFICANT CHANGE UP (ref 24.5–35.6)
BETA+GAMMA TOCOPHEROL SERPL-MCNC: 1.5 MG/L — SIGNIFICANT CHANGE UP (ref 0.5–4.9)
BUN SERPL-MCNC: 7 MG/DL — SIGNIFICANT CHANGE UP (ref 7–23)
CALCIUM SERPL-MCNC: 7.9 MG/DL — LOW (ref 8.4–10.5)
CHLORIDE SERPL-SCNC: 98 MMOL/L — SIGNIFICANT CHANGE UP (ref 96–108)
CO2 SERPL-SCNC: 26 MMOL/L — SIGNIFICANT CHANGE UP (ref 22–31)
CREAT SERPL-MCNC: 0.62 MG/DL — SIGNIFICANT CHANGE UP (ref 0.5–1.3)
EGFR: 83 ML/MIN/1.73M2 — SIGNIFICANT CHANGE UP
GLUCOSE BLDC GLUCOMTR-MCNC: 109 MG/DL — HIGH (ref 70–99)
GLUCOSE SERPL-MCNC: 100 MG/DL — HIGH (ref 70–99)
GRAM STN FLD: SIGNIFICANT CHANGE UP
HCT VFR BLD CALC: 29.8 % — LOW (ref 34.5–45)
HGB BLD-MCNC: 9.2 G/DL — LOW (ref 11.5–15.5)
INR BLD: 1.1 RATIO — SIGNIFICANT CHANGE UP (ref 0.85–1.18)
LEGIONELLA AB SER-ACNC: <0.91 — SIGNIFICANT CHANGE UP (ref 0–0.9)
MAGNESIUM SERPL-MCNC: 1.8 MG/DL — SIGNIFICANT CHANGE UP (ref 1.6–2.6)
MCHC RBC-ENTMCNC: 30.9 GM/DL — LOW (ref 32–36)
MCHC RBC-ENTMCNC: 31 PG — SIGNIFICANT CHANGE UP (ref 27–34)
MCV RBC AUTO: 100.3 FL — HIGH (ref 80–100)
NRBC # BLD: 0 /100 WBCS — SIGNIFICANT CHANGE UP (ref 0–0)
PHOSPHATE SERPL-MCNC: 3 MG/DL — SIGNIFICANT CHANGE UP (ref 2.5–4.5)
PLATELET # BLD AUTO: 133 K/UL — LOW (ref 150–400)
POTASSIUM SERPL-MCNC: 3.9 MMOL/L — SIGNIFICANT CHANGE UP (ref 3.5–5.3)
POTASSIUM SERPL-SCNC: 3.9 MMOL/L — SIGNIFICANT CHANGE UP (ref 3.5–5.3)
PROTHROM AB SERPL-ACNC: 12.1 SEC — SIGNIFICANT CHANGE UP (ref 9.5–13)
RBC # BLD: 2.97 M/UL — LOW (ref 3.8–5.2)
RBC # FLD: 16.4 % — HIGH (ref 10.3–14.5)
SODIUM SERPL-SCNC: 135 MMOL/L — SIGNIFICANT CHANGE UP (ref 135–145)
SPECIMEN SOURCE: SIGNIFICANT CHANGE UP
SYNOVIAL CRYSTALS CLARITY: ABNORMAL
SYNOVIAL CRYSTALS COLOR: YELLOW
SYNOVIAL CRYSTALS ID: ABNORMAL
SYNOVIAL CRYSTALS TUBE: SIGNIFICANT CHANGE UP
WBC # BLD: 4.26 K/UL — SIGNIFICANT CHANGE UP (ref 3.8–10.5)
WBC # FLD AUTO: 4.26 K/UL — SIGNIFICANT CHANGE UP (ref 3.8–10.5)

## 2024-02-22 PROCEDURE — 99232 SBSQ HOSP IP/OBS MODERATE 35: CPT

## 2024-02-22 PROCEDURE — 76882 US LMTD JT/FCL EVL NVASC XTR: CPT | Mod: 26,RT

## 2024-02-22 PROCEDURE — 99231 SBSQ HOSP IP/OBS SF/LOW 25: CPT | Mod: GC

## 2024-02-22 RX ORDER — CEFPODOXIME PROXETIL 100 MG
200 TABLET ORAL EVERY 12 HOURS
Refills: 0 | Status: DISCONTINUED | OUTPATIENT
Start: 2024-02-22 | End: 2024-02-24

## 2024-02-22 RX ORDER — LANOLIN ALCOHOL/MO/W.PET/CERES
3 CREAM (GRAM) TOPICAL AT BEDTIME
Refills: 0 | Status: DISCONTINUED | OUTPATIENT
Start: 2024-02-22 | End: 2024-02-24

## 2024-02-22 RX ADMIN — PIPERACILLIN AND TAZOBACTAM 25 GRAM(S): 4; .5 INJECTION, POWDER, LYOPHILIZED, FOR SOLUTION INTRAVENOUS at 02:52

## 2024-02-22 RX ADMIN — ENOXAPARIN SODIUM 40 MILLIGRAM(S): 100 INJECTION SUBCUTANEOUS at 13:35

## 2024-02-22 RX ADMIN — Medication 200 MILLIGRAM(S): at 19:40

## 2024-02-22 RX ADMIN — Medication 500 MILLIGRAM(S): at 13:36

## 2024-02-22 RX ADMIN — CHLORHEXIDINE GLUCONATE 1 APPLICATION(S): 213 SOLUTION TOPICAL at 14:04

## 2024-02-22 RX ADMIN — Medication 3 MILLIGRAM(S): at 22:12

## 2024-02-22 RX ADMIN — Medication 400 MILLIGRAM(S): at 00:49

## 2024-02-22 RX ADMIN — Medication 1000 MILLIGRAM(S): at 01:49

## 2024-02-22 RX ADMIN — Medication 1 TABLET(S): at 13:45

## 2024-02-22 RX ADMIN — Medication 63.75 MILLIMOLE(S): at 02:52

## 2024-02-22 NOTE — DISCHARGE NOTE PROVIDER - CARE PROVIDER_API CALL
Shorty Farah  Internal Medicine  [address], [address]  [place] [address]  Phone: [phone]  Fax: [phone]  Follow Up Time:

## 2024-02-22 NOTE — DISCHARGE NOTE PROVIDER - NSDCCPCAREPLAN_GEN_ALL_CORE_FT
PRINCIPAL DISCHARGE DIAGNOSIS  Diagnosis: Acute UTI  Assessment and Plan of Treatment: We found that you had a urinary tract infection with a bacteria called E. coli. We gave you 5 days of antibiotics and you improved.   If you develop fevers, pain with urination, or trouble urinating please return to the hospital.      SECONDARY DISCHARGE DIAGNOSES  Diagnosis: Pneumonia, aspiration  Assessment and Plan of Treatment: We also treated you for a pneumonia (lung infection). This is possibly from aspiration (food going down to lungs instead of stomach). You were seen by the swallow specialists who recommended that you eat a minced and moist diet with mildly thick liquids. The antibiotics you received for your urine infection also cover this.    Diagnosis: Swelling of joint, knee, right  Assessment and Plan of Treatment: You also had some swelling in your knee. We tapped the fluid which did not show any evidence of infection. This is likely from your osteoarthritis or pseudogout. You were also seen by the rheumatologists who recommended no further treatments for this.

## 2024-02-22 NOTE — PROGRESS NOTE ADULT - SUBJECTIVE AND OBJECTIVE BOX
MAURI SILVA  [id number]    INTERVAL HPI/OVERNIGHT EVENTS: Pt says she feels well. Per nurse, still unable to walk    MEDICATIONS  (STANDING):  ascorbic acid 500 milliGRAM(s) Oral daily  cefpodoxime 200 milliGRAM(s) Oral every 12 hours  chlorhexidine 4% Liquid 1 Application(s) Topical daily  enoxaparin Injectable 40 milliGRAM(s) SubCutaneous every 24 hours  multivitamin 1 Tablet(s) Oral daily    MEDICATIONS  (PRN):  ondansetron Injectable 4 milliGRAM(s) IV Push every 8 hours PRN Nausea and/or Vomiting      Allergies    No Known Allergies    Intolerances        Review of Systems: as above    Vital Signs Last 24 Hrs  T(C): 36.6 (22 Feb 2024 09:12), Max: 36.9 (21 Feb 2024 21:57)  T(F): 97.8 (22 Feb 2024 09:12), Max: 98.5 (21 Feb 2024 21:57)  HR: 79 (22 Feb 2024 09:12) (71 - 85)  BP: 152/78 (22 Feb 2024 09:12) (137/73 - 163/87)  BP(mean): --  RR: 18 (22 Feb 2024 09:12) (18 - 20)  SpO2: 98% (22 Feb 2024 09:12) (93% - 98%)    Parameters below as of 22 Feb 2024 09:12  Patient On (Oxygen Delivery Method): room air      Physical Exam:  General: Breathing comfortably on room air, no distress  MSK: extensive b/l quang/heberdens nodes and squaring of CMC joints bilaterally. Improved L knee effusion since yesterday, able to flex knee to 90 degrees without pain. R knee w/ effusion, however able to flex R knee to 90 degrees.  Skin: no visible rashes      LABS:                        9.2    4.26  )-----------( 133      ( 22 Feb 2024 07:20 )             29.8     02-22    135  |  98  |  7   ----------------------------<  100<H>  3.9   |  26  |  0.62    Ca    7.9<L>      22 Feb 2024 07:18  Phos  3.0     02-22  Mg     1.8     02-22    TPro  6.2  /  Alb  3.4  /  TBili  0.5  /  DBili  x   /  AST  12  /  ALT  8<L>  /  AlkPhos  85  02-21    PT/INR - ( 21 Feb 2024 22:57 )   PT: 12.1 sec;   INR: 1.10 ratio         PTT - ( 21 Feb 2024 22:57 )  PTT:34.0 sec  Urinalysis Basic - ( 22 Feb 2024 07:18 )    Color: x / Appearance: x / SG: x / pH: x  Gluc: 100 mg/dL / Ketone: x  / Bili: x / Urobili: x   Blood: x / Protein: x / Nitrite: x   Leuk Esterase: x / RBC: x / WBC x   Sq Epi: x / Non Sq Epi: x / Bacteria: x    Cell Count, Body Fluid (02.21.24 @ 16:59)   Tube Type: Lavender  Fluid Type: Synovial Fluid  Body Fluid Appearance: Turbid  Color - Body Fluid: Yellow  Total Nucleated Cell Count, Body Fluid: 3005: Count corrected by dilution method.   Reference Ranges:   Synovial Fluid   Total nucleated cells < 150 cells/uL   Neutrophils <25%   Lymphocytes 10-15%   Monocytes/Macrophages   Other parameters- No established reference ranges.   Bronchoalveolar lavage   Macrophages >85%   Lymphocytes 10-15%   Neutrophils <3%   Eosinophils <1%   Other parameters- No established reference ranges.   Peritoneal/pleural/pericardial fluid/other body fluid types   No established reference ranges. cells/uL  Total RBC Count: 5000 cells/uL  Neutrophils-Body Fluid: 71 %  Monocyte/Macrophage Count - Body Fluid: 29 %  Crystals, Synovial Fluid (02.21.24 @ 16:59)   Synovial Crystals Clarity: Cloudy  Synovial Crystals Tube: Lavendar Top Tube  Synovial Crystals Color: Yellow  Synovial Crystals Id: Crystals Present Extracellular, and intracellular CPPD (calcium pyrophosphate) present.     MAURI SILVA  [id number]    INTERVAL HPI/OVERNIGHT EVENTS: Pt says she feels well. Per nurse, still unable to walk    MEDICATIONS  (STANDING):  ascorbic acid 500 milliGRAM(s) Oral daily  cefpodoxime 200 milliGRAM(s) Oral every 12 hours  chlorhexidine 4% Liquid 1 Application(s) Topical daily  enoxaparin Injectable 40 milliGRAM(s) SubCutaneous every 24 hours  multivitamin 1 Tablet(s) Oral daily    MEDICATIONS  (PRN):  ondansetron Injectable 4 milliGRAM(s) IV Push every 8 hours PRN Nausea and/or Vomiting      Allergies    No Known Allergies    Intolerances        Review of Systems: unable to obtain from patient     Vital Signs Last 24 Hrs  T(C): 36.6 (22 Feb 2024 09:12), Max: 36.9 (21 Feb 2024 21:57)  T(F): 97.8 (22 Feb 2024 09:12), Max: 98.5 (21 Feb 2024 21:57)  HR: 79 (22 Feb 2024 09:12) (71 - 85)  BP: 152/78 (22 Feb 2024 09:12) (137/73 - 163/87)  BP(mean): --  RR: 18 (22 Feb 2024 09:12) (18 - 20)  SpO2: 98% (22 Feb 2024 09:12) (93% - 98%)    Parameters below as of 22 Feb 2024 09:12  Patient On (Oxygen Delivery Method): room air      Physical Exam:  General: Breathing comfortably on room air, no distress  MSK: extensive b/l quang/heberdens nodes and squaring of CMC joints bilaterally. Improved L knee effusion since yesterday, able to flex knee to 90 degrees without pain. R knee w/ effusion, however able to flex R knee to 90 degrees.  Skin: no visible rashes      LABS:                        9.2    4.26  )-----------( 133      ( 22 Feb 2024 07:20 )             29.8     02-22    135  |  98  |  7   ----------------------------<  100<H>  3.9   |  26  |  0.62    Ca    7.9<L>      22 Feb 2024 07:18  Phos  3.0     02-22  Mg     1.8     02-22    TPro  6.2  /  Alb  3.4  /  TBili  0.5  /  DBili  x   /  AST  12  /  ALT  8<L>  /  AlkPhos  85  02-21    PT/INR - ( 21 Feb 2024 22:57 )   PT: 12.1 sec;   INR: 1.10 ratio         PTT - ( 21 Feb 2024 22:57 )  PTT:34.0 sec  Urinalysis Basic - ( 22 Feb 2024 07:18 )    Color: x / Appearance: x / SG: x / pH: x  Gluc: 100 mg/dL / Ketone: x  / Bili: x / Urobili: x   Blood: x / Protein: x / Nitrite: x   Leuk Esterase: x / RBC: x / WBC x   Sq Epi: x / Non Sq Epi: x / Bacteria: x    Cell Count, Body Fluid (02.21.24 @ 16:59)   Tube Type: Lavender  Fluid Type: Synovial Fluid  Body Fluid Appearance: Turbid  Color - Body Fluid: Yellow  Total Nucleated Cell Count, Body Fluid: 3005: Count corrected by dilution method.   Reference Ranges:   Synovial Fluid   Total nucleated cells < 150 cells/uL   Neutrophils <25%   Lymphocytes 10-15%   Monocytes/Macrophages   Other parameters- No established reference ranges.   Bronchoalveolar lavage   Macrophages >85%   Lymphocytes 10-15%   Neutrophils <3%   Eosinophils <1%   Other parameters- No established reference ranges.   Peritoneal/pleural/pericardial fluid/other body fluid types   No established reference ranges. cells/uL  Total RBC Count: 5000 cells/uL  Neutrophils-Body Fluid: 71 %  Monocyte/Macrophage Count - Body Fluid: 29 %  Crystals, Synovial Fluid (02.21.24 @ 16:59)   Synovial Crystals Clarity: Cloudy  Synovial Crystals Tube: Lavendar Top Tube  Synovial Crystals Color: Yellow  Synovial Crystals Id: Crystals Present Extracellular, and intracellular CPPD (calcium pyrophosphate) present.

## 2024-02-22 NOTE — DISCHARGE NOTE PROVIDER - NSDCCPTREATMENT_GEN_ALL_CORE_FT
PRINCIPAL PROCEDURE  Procedure: CT angiogram head arteries w contrast  Findings and Treatment: IMPRESSION:  CT PERFUSION: No focal perfusion deficit on visual assessment.  MRI brain may be obtained for further assessment.  CT ANGIOGRAPHY NECK: No vessel narrowing or occlusion in the neck.  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      SECONDARY PROCEDURE  Procedure: Knee arthrocentesis  Findings and Treatment: Procedure Details:	75cc straw colored fluid obtained and sent to lab for cell count/crystal/gram stain

## 2024-02-22 NOTE — PROVIDER CONTACT NOTE (OTHER) - SITUATION
Pt was minimally responsive during hygiene care while being turned and repositioned. This was a change from last seen well at 2130 when she was chatting animatedly with RN.

## 2024-02-22 NOTE — DISCHARGE NOTE PROVIDER - HOSPITAL COURSE
HPI:  89 yo F w/ PMHx of hypothyroidism, atrial fibrillation (not on A/C due to age), GERD, HLD presenting for change in speech and lethargy since this morning. Per son Ross, pt uses a walker at baseline and was having a difficult time getting around yesterday and was not walking or eating much. Today, pt's son was called because pt woke up and looked terrible and grey, Pt's speech was garbled, sounded like mumbling, and pt was more lethargic, she also complained of L knee pain and it was more swollen than usual. Also w/ L shoulder pain. Her son came to the living facility with his kids and she was reorientable and her speech seemed to improve. She ate a cereal. After her son left, the facility stated that pt began to deteriorate again w/ mumbling speech and they said they were going to call an ambulance. Son stated that pt also had a cough last week which she was started on albuterol for and the cough completely resolved. At baseline, pt is A&Ox2, she is usually confused but pleasant w/ poor short term memory but able to be reoriented.    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 Pt also received Mag, calcium, and ceftriaxone.  (18 Feb 2024 21:08)    Hospital Course:      Important Medication Changes and Reason:    Active or Pending Issues Requiring Follow-up:    Advanced Directives:   [ ] Full code  [ ] DNR  [ ] Hospice    Discharge Diagnoses:         HPI:  89 yo F w/ PMHx of hypothyroidism, atrial fibrillation (not on A/C due to age), GERD, HLD presenting for change in speech and lethargy since this morning. Per son Ross, pt uses a walker at baseline and was having a difficult time getting around yesterday and was not walking or eating much. Today, pt's son was called because pt woke up and looked terrible and grey, Pt's speech was garbled, sounded like mumbling, and pt was more lethargic, she also complained of L knee pain and it was more swollen than usual. Also w/ L shoulder pain. Her son came to the living facility with his kids and she was reorientable and her speech seemed to improve. She ate a cereal. After her son left, the facility stated that pt began to deteriorate again w/ mumbling speech and they said they were going to call an ambulance. Son stated that pt also had a cough last week which she was started on albuterol for and the cough completely resolved. At baseline, pt is A&Ox2, she is usually confused but pleasant w/ poor short term memory but able to be reoriented.    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 Pt also received Mag, calcium, and ceftriaxone.  (18 Feb 2024 21:08)    Hospital Course:  Pt was admitted to medicine for management of toxic metabolic encephalopathy in the setting of UTI and CAP. She received a 5 day course of cefpodoxime and azithromycin. UCx grew >100K E. coli, and CXR showed RLL infiltrate. She was also seen by speech/swallow and recommended for minced/moist diet with mildly thick liquids. Pt was also had L knee swelling that was tapped, joint fluid consistent with CPPD vs osteoarthritis. Pt was seen by rheum and recommended no further treatments. On day of discharge pt remains Aox1 but hemodynamically stable and tolerating diet.     Important Medication Changes and Reason: None    Active or Pending Issues Requiring Follow-up:    Advanced Directives: DNR/DNI, MOLST completed    Discharge Diagnoses:  -Pneumonia  -UTI

## 2024-02-22 NOTE — PROGRESS NOTE ADULT - PROBLEM SELECTOR PLAN 2
Hx of recurrent UTIs, now present with another UTI    Plan:  -ceftriaxone switched to cefpodoxime, EOT on 02/23 Pt w/ worsening warmth and swelling of L knee and pain, started yesterday. Unknown hx of trauma. Also w/ complaint of L shoulder pain, however appears normal. Ddx of L knee: septic joint vs. gout vs. pseudogout  -L knee xray shows moderate knee effusion  -L knee arthrocentesis on 02/21 elliot out 75cc  -R knee US on 02/22 shows complex effusion    Plan:  -f/u rheumatology recs

## 2024-02-22 NOTE — PROGRESS NOTE ADULT - PROBLEM SELECTOR PLAN 3
Pt w/ worsening warmth and swelling of L knee and pain, started yesterday. Unknown hx of trauma. Also w/ complaint of L shoulder pain, however appears normal. Ddx of L knee: septic joint vs. gout vs. pseudogout  -knee xray shows moderate knee effusion    Plan:  -rheumatology consult for right knee tap Hx of recurrent UTIs, now present with another UTI    Plan:  -ceftriaxone switched to cefpodoxime, EOT on 02/23

## 2024-02-22 NOTE — PROGRESS NOTE ADULT - ASSESSMENT
89 yo F PMH A+Ox2 (baseline), recurrent UTI, hypothyroidism, atrial fibrillation (not on A/C due to age), GERD, HLD p/w 1 day acute change in speech and lethargy found to have UTI and pneumonia w/ improvement in mental status w/ abx. Rheumatology consulted for L knee swelling    #acute oligoarthritis:   =pt found to have L knee swelling on day of admission  =on exam, b/l knee effusion L>R. L knee w/ significant swelling, however no significant pain and low suspicion for septic arthritis. Significant limited ROM in L knee 2/2 effusion. Pt also w/ extensive signs of OA in hands.  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 Rheumatology consulted for L knee swelling    #acute oligoarthritis:   =pt found to have L knee swelling on day of admission  =on exam, b/l knee effusion L>R. L knee w/ significant swelling, however no significant pain and low suspicion for septic arthritis. Significant limited ROM in L knee 2/2 effusion. Pt also w/ extensive signs of OA in hands.  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 There is a question on R knee ultrasound if pt could have Pigmented villonodular synovitis. Pigmented villonodular synovitis can be diagnosed on gold standard MRI, however at pt's age would unlikely to be offered intervention by ortho    Plan:  -has improved ROM in L knee since arthrocentesis, no further treatment needed for OA/CPPD  -if further workup desired for suspicion of  Pigmented villonodular synovitis based on R knee ultrasound, can f/u outpatient w/ orthopedics  -c/w PT     Will sign off. Please TEAMS if questions  Discussed with Attending Dr. Barrington Calle, DO  Rheumatology Fellow  Available on TEAMS 89 yo F PMH A+Ox2 (baseline), recurrent UTI, hypothyroidism, atrial fibrillation (not on A/C due to age), GERD, HLD p/w 1 day acute change in speech and lethargy found to have UTI and pneumonia w/ improvement in mental status w/ abx. Rheumatology consulted for L knee swelling    #acute oligoarthritis:   =pt found to have L knee swelling on day of admission  =on exam, b/l knee effusion L>R. L knee w/ significant swelling, however no significant pain and low suspicion for septic arthritis. Significant limited ROM in L knee 2/2 effusion. Pt also w/ extensive signs of OA in hands.  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 There is a question on R knee ultrasound if pt could have Pigmented villonodular synovitis. Pigmented villonodular synovitis can be diagnosed on gold standard MRI, however at pt's age would unlikely to be offered intervention by ortho    Plan:  -has improved ROM in L knee since arthrocentesis, no further treatment needed for OA/CPPD  -if further workup desired for suspicion of  Pigmented villonodular synovitis based on R knee ultrasound, can f/u outpatient w/ orthopedics and obtain an MRI   -c/w PT     Will sign off. Please TEAMS if questions  Discussed with Attending Dr. Barrington Calle, DO  Rheumatology Fellow  Available on TEAMS

## 2024-02-22 NOTE — DISCHARGE NOTE PROVIDER - ATTENDING DISCHARGE PHYSICAL EXAMINATION:
.  VITAL SIGNS:  T(C): 37 (02-24-24 @ 12:16), Max: 37 (02-24-24 @ 12:16)  T(F): 98.6 (02-24-24 @ 12:16), Max: 98.6 (02-24-24 @ 12:16)  HR: 85 (02-24-24 @ 12:16) (73 - 86)  BP: 157/89 (02-24-24 @ 12:16) (143/81 - 157/89)  BP(mean): --  RR: 18 (02-24-24 @ 12:16) (18 - 18)  SpO2: 96% (02-24-24 @ 12:16) (96% - 97%)  Wt(kg): --    PHYSICAL EXAM:    Constitutional: WDWN resting comfortably in bed; NAD  Head: NC/AT  Eyes: PERRL, EOMI, anicteric sclera  ENT: no nasal discharge; uvula midline, no oropharyngeal erythema or exudates; MMM  Neck: supple; no JVD or thyromegaly  Respiratory: CTA B/L; no W/R/R, no retractions  Cardiac: +S1/S2; RRR; no M/R/G; PMI non-displaced  Gastrointestinal: abdomen soft, NT/ND; no rebound or guarding; +BSx4  Back: spine midline, no bony tenderness or step-offs; no CVAT B/L  Extremities: WWP, no clubbing or cyanosis; no peripheral edema  Musculoskeletal: NROM x4; no joint swelling, tenderness or erythema  Vascular: 2+ radial, femoral, DP/PT pulses B/L  Dermatologic: skin warm, dry and intact; no rashes, wounds, or scars  Lymphatic: no submandibular or cervical LAD  Neurologic: AAOx1 to person; CNII-XII grossly intact; no focal deficits  Psychiatric: affect and characteristics of appearance, verbalizations, behaviors are appropriate

## 2024-02-22 NOTE — DISCHARGE NOTE PROVIDER - NSDCMRMEDTOKEN_GEN_ALL_CORE_FT
acetaminophen 325 mg oral tablet: 2 tab(s) orally 2 times a day as needed for pain  albuterol 0.63 mg/3 mL (0.021%) inhalation solution: 3 milliliter(s) by nebulizer 3 times a day  levothyroxine 50 mcg (0.05 mg) oral tablet: 1 tab(s) orally once a day  pantoprazole 40 mg oral delayed release tablet: 1 tab(s) orally once a day   acetaminophen 325 mg oral tablet: 2 tab(s) orally 2 times a day as needed for pain  albuterol 0.63 mg/3 mL (0.021%) inhalation solution: 3 milliliter(s) by nebulizer 3 times a day  ascorbic acid 500 mg oral tablet: 1 tab(s) orally once a day  levothyroxine 50 mcg (0.05 mg) oral tablet: 1 tab(s) orally once a day  Multiple Vitamins oral tablet: 1 tab(s) orally once a day  pantoprazole 40 mg oral delayed release tablet: 1 tab(s) orally once a day

## 2024-02-22 NOTE — PROGRESS NOTE ADULT - PROBLEM SELECTOR PLAN 1
Baseline A&Ox2, presently A&OX1 w/ garbled speech. Pt w/ change this morning, appearing more lethargic w/ change in speech.     -Ddx: infectious (pneumonia, UTI, septic joint) vs. metabolic vs. electrolyte abnormality. Less likely neurologic   -code stroke called on 2/18, w/ nonlateralizing neuro exam  -CT head 2/18: no acute findings  -mag and calcium low, s/p repletions  -Urine culture positive for E Coli  -CXR shows right basilar pneumonia, possible aspiration    Plan:   -PT re-evaluation for disposition planning  -ceftriaxone 1g for 5 days (2/18 -), azithromycin 500mg (2/19 -)  -f/u Vitamin B1, B6, B12, Folate, Vitamin D 25OH, Vitamin E, TSH, FT4, Ammonia, CK  -follow up neurology recs  -on minced and moist diet with mildly thickened liquids Baseline A&Ox2, presently A&OX1 w/ garbled speech. Pt w/ change this morning, appearing more lethargic w/ change in speech.     -Ddx: infectious (pneumonia, UTI, septic joint) vs. metabolic vs. electrolyte abnormality. Less likely neurologic   -code stroke called on 2/18, w/ nonlateralizing neuro exam  -CT head 2/18: no acute findings  -mag and calcium low, s/p repletions  -Urine culture positive for E Coli  -CXR shows right basilar pneumonia, possible aspiration    Plan:   -PT recommends SNF, family agreeable but hopes correction if possible  -cefpodoxime until 02/23, patient finished azithromycin  -f/u Vitamin B1, B6, B12, Folate, Vitamin D 25OH, Vitamin E, TSH, FT4, Ammonia, CK  -follow up neurology recs  -on minced and moist diet with mildly thickened liquids

## 2024-02-22 NOTE — DISCHARGE NOTE PROVIDER - NSDCFUADDAPPT_GEN_ALL_CORE_FT
APPTS ARE READY TO BE MADE: [X ] YES    Best Family or Patient Contact (if needed):    Additional Information about above appointments (if needed):    1: [facility]  2:   3:     Other comments or requests:    APPTS ARE READY TO BE MADE: [X ] YES    Best Family or Patient Contact (if needed):    Additional Information about above appointments (if needed):    1: [facility]  2:   3:     Other comments or requests:   Patient is being discharged to rehab. Patient/caregiver will arrange follow up appointments.

## 2024-02-23 LAB
ALBUMIN SERPL ELPH-MCNC: 3.6 G/DL — SIGNIFICANT CHANGE UP (ref 3.3–5)
ALP SERPL-CCNC: 85 U/L — SIGNIFICANT CHANGE UP (ref 40–120)
ALT FLD-CCNC: 8 U/L — LOW (ref 10–45)
ANION GAP SERPL CALC-SCNC: 14 MMOL/L — SIGNIFICANT CHANGE UP (ref 5–17)
AST SERPL-CCNC: 19 U/L — SIGNIFICANT CHANGE UP (ref 10–40)
BASOPHILS # BLD AUTO: 0 K/UL — SIGNIFICANT CHANGE UP (ref 0–0.2)
BASOPHILS NFR BLD AUTO: 0 % — SIGNIFICANT CHANGE UP (ref 0–2)
BILIRUB SERPL-MCNC: 0.5 MG/DL — SIGNIFICANT CHANGE UP (ref 0.2–1.2)
BUN SERPL-MCNC: 6 MG/DL — LOW (ref 7–23)
CALCIUM SERPL-MCNC: 8.2 MG/DL — LOW (ref 8.4–10.5)
CHLORIDE SERPL-SCNC: 99 MMOL/L — SIGNIFICANT CHANGE UP (ref 96–108)
CO2 SERPL-SCNC: 26 MMOL/L — SIGNIFICANT CHANGE UP (ref 22–31)
CREAT SERPL-MCNC: 0.67 MG/DL — SIGNIFICANT CHANGE UP (ref 0.5–1.3)
CULTURE RESULTS: SIGNIFICANT CHANGE UP
CULTURE RESULTS: SIGNIFICANT CHANGE UP
EGFR: 81 ML/MIN/1.73M2 — SIGNIFICANT CHANGE UP
EOSINOPHIL # BLD AUTO: 0.01 K/UL — SIGNIFICANT CHANGE UP (ref 0–0.5)
EOSINOPHIL NFR BLD AUTO: 0.2 % — SIGNIFICANT CHANGE UP (ref 0–6)
GLUCOSE SERPL-MCNC: 90 MG/DL — SIGNIFICANT CHANGE UP (ref 70–99)
HCT VFR BLD CALC: 33 % — LOW (ref 34.5–45)
HGB BLD-MCNC: 10.4 G/DL — LOW (ref 11.5–15.5)
IMM GRANULOCYTES NFR BLD AUTO: 0.7 % — SIGNIFICANT CHANGE UP (ref 0–0.9)
LYMPHOCYTES # BLD AUTO: 0.74 K/UL — LOW (ref 1–3.3)
LYMPHOCYTES # BLD AUTO: 16.1 % — SIGNIFICANT CHANGE UP (ref 13–44)
MAGNESIUM SERPL-MCNC: 1.8 MG/DL — SIGNIFICANT CHANGE UP (ref 1.6–2.6)
MCHC RBC-ENTMCNC: 31.3 PG — SIGNIFICANT CHANGE UP (ref 27–34)
MCHC RBC-ENTMCNC: 31.5 GM/DL — LOW (ref 32–36)
MCV RBC AUTO: 99.4 FL — SIGNIFICANT CHANGE UP (ref 80–100)
MONOCYTES # BLD AUTO: 0.51 K/UL — SIGNIFICANT CHANGE UP (ref 0–0.9)
MONOCYTES NFR BLD AUTO: 11.1 % — SIGNIFICANT CHANGE UP (ref 2–14)
NEUTROPHILS # BLD AUTO: 3.32 K/UL — SIGNIFICANT CHANGE UP (ref 1.8–7.4)
NEUTROPHILS NFR BLD AUTO: 71.9 % — SIGNIFICANT CHANGE UP (ref 43–77)
NRBC # BLD: 0 /100 WBCS — SIGNIFICANT CHANGE UP (ref 0–0)
PHOSPHATE SERPL-MCNC: 2.2 MG/DL — LOW (ref 2.5–4.5)
PLATELET # BLD AUTO: 139 K/UL — LOW (ref 150–400)
POTASSIUM SERPL-MCNC: 3.6 MMOL/L — SIGNIFICANT CHANGE UP (ref 3.5–5.3)
POTASSIUM SERPL-SCNC: 3.6 MMOL/L — SIGNIFICANT CHANGE UP (ref 3.5–5.3)
PROT SERPL-MCNC: 6.3 G/DL — SIGNIFICANT CHANGE UP (ref 6–8.3)
RBC # BLD: 3.32 M/UL — LOW (ref 3.8–5.2)
RBC # FLD: 16.4 % — HIGH (ref 10.3–14.5)
SARS-COV-2 RNA SPEC QL NAA+PROBE: SIGNIFICANT CHANGE UP
SODIUM SERPL-SCNC: 139 MMOL/L — SIGNIFICANT CHANGE UP (ref 135–145)
SPECIMEN SOURCE: SIGNIFICANT CHANGE UP
SPECIMEN SOURCE: SIGNIFICANT CHANGE UP
VIT B1 SERPL-MCNC: 114.8 NMOL/L — SIGNIFICANT CHANGE UP (ref 66.5–200)
WBC # BLD: 4.61 K/UL — SIGNIFICANT CHANGE UP (ref 3.8–10.5)
WBC # FLD AUTO: 4.61 K/UL — SIGNIFICANT CHANGE UP (ref 3.8–10.5)

## 2024-02-23 PROCEDURE — 99232 SBSQ HOSP IP/OBS MODERATE 35: CPT

## 2024-02-23 RX ORDER — LEVOTHYROXINE SODIUM 125 MCG
50 TABLET ORAL DAILY
Refills: 0 | Status: DISCONTINUED | OUTPATIENT
Start: 2024-02-23 | End: 2024-02-24

## 2024-02-23 RX ORDER — ASCORBIC ACID 60 MG
1 TABLET,CHEWABLE ORAL
Qty: 0 | Refills: 0 | DISCHARGE
Start: 2024-02-23

## 2024-02-23 RX ORDER — SODIUM,POTASSIUM PHOSPHATES 278-250MG
1 POWDER IN PACKET (EA) ORAL ONCE
Refills: 0 | Status: COMPLETED | OUTPATIENT
Start: 2024-02-23 | End: 2024-02-23

## 2024-02-23 RX ADMIN — CHLORHEXIDINE GLUCONATE 1 APPLICATION(S): 213 SOLUTION TOPICAL at 12:03

## 2024-02-23 RX ADMIN — Medication 1 PACKET(S): at 12:03

## 2024-02-23 RX ADMIN — Medication 200 MILLIGRAM(S): at 17:13

## 2024-02-23 RX ADMIN — Medication 1 TABLET(S): at 12:03

## 2024-02-23 RX ADMIN — ENOXAPARIN SODIUM 40 MILLIGRAM(S): 100 INJECTION SUBCUTANEOUS at 12:03

## 2024-02-23 RX ADMIN — Medication 500 MILLIGRAM(S): at 12:03

## 2024-02-23 RX ADMIN — Medication 200 MILLIGRAM(S): at 06:53

## 2024-02-23 NOTE — PROGRESS NOTE ADULT - PROBLEM SELECTOR PLAN 1
Baseline A&Ox2, presently A&OX1 w/ garbled speech. Pt w/ change this morning, appearing more lethargic w/ change in speech.     -Ddx: infectious (pneumonia, UTI, septic joint) vs. metabolic vs. electrolyte abnormality. Less likely neurologic   -code stroke called on 2/18, w/ nonlateralizing neuro exam  -CT head 2/18: no acute findings  -mag and calcium low, s/p repletions  -Urine culture positive for E Coli  -CXR shows right basilar pneumonia, possible aspiration    Plan:   -PT recommends SNF, family agreeable but hopes intermediate if possible  -cefpodoxime until 02/23, patient finished azithromycin  -f/u Vitamin B1, B6, B12, Folate, Vitamin D 25OH, Vitamin E, TSH, FT4, Ammonia, CK  -follow up neurology recs  -on minced and moist diet with mildly thickened liquids Baseline A&Ox2, presently A&OX1 w/ garbled speech. Pt w/ change this morning, appearing more lethargic w/ change in speech.     -Ddx: infectious (pneumonia, UTI, septic joint) vs. metabolic vs. electrolyte abnormality. Less likely neurologic   -code stroke called on 2/18, w/ nonlateralizing neuro exam  -CT head 2/18: no acute findings  -mag and calcium low, s/p repletions  -Urine culture positive for E Coli  -CXR shows right basilar pneumonia, possible aspiration    Plan:   -PT recommends SNF, family agreeable but hopes [facility] if possible  -on minced and moist diet with mildly thickened liquids

## 2024-02-23 NOTE — PROGRESS NOTE ADULT - SUBJECTIVE AND OBJECTIVE BOX
DATE OF SERVICE: 02-23-24 @ 07:39    Patient is a 89y old  Female who presents with a chief complaint of AMS, garbled speech (22 Feb 2024 12:56)      SUBJECTIVE / OVERNIGHT EVENTS:    MEDICATIONS  (STANDING):  ascorbic acid 500 milliGRAM(s) Oral daily  cefpodoxime 200 milliGRAM(s) Oral every 12 hours  chlorhexidine 4% Liquid 1 Application(s) Topical daily  enoxaparin Injectable 40 milliGRAM(s) SubCutaneous every 24 hours  multivitamin 1 Tablet(s) Oral daily    MEDICATIONS  (PRN):  melatonin 3 milliGRAM(s) Oral at bedtime PRN Sleep  ondansetron Injectable 4 milliGRAM(s) IV Push every 8 hours PRN Nausea and/or Vomiting      Vital Signs Last 24 Hrs  T(C): 36.6 (23 Feb 2024 05:15), Max: 36.8 (22 Feb 2024 18:16)  T(F): 97.8 (23 Feb 2024 05:15), Max: 98.2 (22 Feb 2024 18:16)  HR: 51 (23 Feb 2024 05:15) (51 - 88)  BP: 166/79 (23 Feb 2024 05:15) (125/76 - 166/79)  BP(mean): --  RR: 18 (23 Feb 2024 05:15) (18 - 20)  SpO2: 91% (23 Feb 2024 05:15) (91% - 98%)    Parameters below as of 23 Feb 2024 05:15  Patient On (Oxygen Delivery Method): room air      CAPILLARY BLOOD GLUCOSE      POCT Blood Glucose.: 109 mg/dL (22 Feb 2024 20:44)    I&O's Summary      PHYSICAL EXAM:  GENERAL: NAD, well-developed  HEAD:  Atraumatic, Normocephalic  EYES: EOMI, PERRLA, conjunctiva and sclera clear  NECK: Supple, No JVD  CHEST/LUNG: Clear to auscultation bilaterally; No wheeze  HEART: Regular rate and rhythm; No murmurs, rubs, or gallops  ABDOMEN: Soft, Nontender, Nondistended; Bowel sounds present  EXTREMITIES:  2+ Peripheral Pulses, No clubbing, cyanosis, or edema  PSYCH: AAOx3  NEUROLOGY: non-focal  SKIN: No rashes or lesions    LABS:                        9.2    4.26  )-----------( 133      ( 22 Feb 2024 07:20 )             29.8     02-22    135  |  98  |  7   ----------------------------<  100<H>  3.9   |  26  |  0.62    Ca    7.9<L>      22 Feb 2024 07:18  Phos  3.0     02-22  Mg     1.8     02-22    TPro  6.2  /  Alb  3.4  /  TBili  0.5  /  DBili  x   /  AST  12  /  ALT  8<L>  /  AlkPhos  85  02-21    PT/INR - ( 21 Feb 2024 22:57 )   PT: 12.1 sec;   INR: 1.10 ratio         PTT - ( 21 Feb 2024 22:57 )  PTT:34.0 sec      Urinalysis Basic - ( 22 Feb 2024 07:18 )    Color: x / Appearance: x / SG: x / pH: x  Gluc: 100 mg/dL / Ketone: x  / Bili: x / Urobili: x   Blood: x / Protein: x / Nitrite: x   Leuk Esterase: x / RBC: x / WBC x   Sq Epi: x / Non Sq Epi: x / Bacteria: x        RADIOLOGY & ADDITIONAL TESTS:    Imaging Personally Reviewed:    Consultant(s) Notes Reviewed:      Care Discussed with Consultants/Other Providers:   DATE OF SERVICE: 02-23-24 @ 07:39    Patient is a 89y old  Female who presents with a chief complaint of AMS, garbled speech (22 Feb 2024 12:56)      SUBJECTIVE / OVERNIGHT EVENTS: NAEO. Patient not in pain.    MEDICATIONS  (STANDING):  ascorbic acid 500 milliGRAM(s) Oral daily  cefpodoxime 200 milliGRAM(s) Oral every 12 hours  chlorhexidine 4% Liquid 1 Application(s) Topical daily  enoxaparin Injectable 40 milliGRAM(s) SubCutaneous every 24 hours  multivitamin 1 Tablet(s) Oral daily    MEDICATIONS  (PRN):  melatonin 3 milliGRAM(s) Oral at bedtime PRN Sleep  ondansetron Injectable 4 milliGRAM(s) IV Push every 8 hours PRN Nausea and/or Vomiting      Vital Signs Last 24 Hrs  T(C): 36.6 (23 Feb 2024 05:15), Max: 36.8 (22 Feb 2024 18:16)  T(F): 97.8 (23 Feb 2024 05:15), Max: 98.2 (22 Feb 2024 18:16)  HR: 51 (23 Feb 2024 05:15) (51 - 88)  BP: 166/79 (23 Feb 2024 05:15) (125/76 - 166/79)  BP(mean): --  RR: 18 (23 Feb 2024 05:15) (18 - 20)  SpO2: 91% (23 Feb 2024 05:15) (91% - 98%)    Parameters below as of 23 Feb 2024 05:15  Patient On (Oxygen Delivery Method): room air      CAPILLARY BLOOD GLUCOSE      POCT Blood Glucose.: 109 mg/dL (22 Feb 2024 20:44)    I&O's Summary      PHYSICAL EXAM:  GENERAL: NAD, confused, interactive, pleasant  HEAD:  Atraumatic, Normocephalic  EYES: EOMI, conjunctiva and sclera clear  CHEST/LUNG: Clear to auscultation bilaterally; No wheeze  HEART: irregular rate, no murmurs  ABDOMEN: Soft, Nontender, Nondistended; Bowel sounds present  EXTREMITIES:  2+ Peripheral Pulses, No tenderness, edema, or erythema  PSYCH: AAOx1-2  NEUROLOGY: non-focal  SKIN: No rashes or lesions    LABS:                        9.2    4.26  )-----------( 133      ( 22 Feb 2024 07:20 )             29.8     02-22    135  |  98  |  7   ----------------------------<  100<H>  3.9   |  26  |  0.62    Ca    7.9<L>      22 Feb 2024 07:18  Phos  3.0     02-22  Mg     1.8     02-22    TPro  6.2  /  Alb  3.4  /  TBili  0.5  /  DBili  x   /  AST  12  /  ALT  8<L>  /  AlkPhos  85  02-21    PT/INR - ( 21 Feb 2024 22:57 )   PT: 12.1 sec;   INR: 1.10 ratio         PTT - ( 21 Feb 2024 22:57 )  PTT:34.0 sec      Urinalysis Basic - ( 22 Feb 2024 07:18 )    Color: x / Appearance: x / SG: x / pH: x  Gluc: 100 mg/dL / Ketone: x  / Bili: x / Urobili: x   Blood: x / Protein: x / Nitrite: x   Leuk Esterase: x / RBC: x / WBC x   Sq Epi: x / Non Sq Epi: x / Bacteria: x        RADIOLOGY & ADDITIONAL TESTS:    Imaging Personally Reviewed:    Consultant(s) Notes Reviewed:      Care Discussed with Consultants/Other Providers:

## 2024-02-23 NOTE — PROGRESS NOTE ADULT - PROBLEM SELECTOR PLAN 3
Hx of recurrent UTIs, now present with another UTI    Plan:  -ceftriaxone switched to cefpodoxime, EOT on 02/23

## 2024-02-23 NOTE — PROGRESS NOTE ADULT - ATTENDING COMMENTS
Agree with fellow's assessment and plan as above.  Discussed with primary team
89F PMH hypothyroidism, Afib, GERD, HLD, presents with AMS in setting of left knee pain     #Encephalopathy   Unclear etiology. Baseline patient is AAOx2, improved from prior exam, and now approaching baseline.   - Infectious workup notable for E. Coli in UCx and possible PNA. Will treat with cefpodoxime and azithromycin for 5 day course for CAP. Cefpodoxime should also cover UTI, but will follow up with sensitivities.    - Will hold off on MR brain or vEEG at this time   - XR knee ordered to evaluate for signs of OM, continue antibiotics as ordered    Remainder as above
89F PMH hypothyroidism, Afib, GERD, HLD, presents with AMS in setting of left knee pain     #Encephalopathy   Unclear etiology. Baseline patient is AAOx2, improved from prior exam, and now approaching baseline.   - Infectious workup notable for E. Coli in UCx and possible PNA. Will treat with cefpodoxime and azithromycin for 5 day course for CAP. Cefpodoxime should also cover UTI, but will follow up with sensitivities.    - Will hold off on MR brain or vEEG at this time, mental status at baseline     Remainder as above
89F PMH hypothyroidism, Afib, GERD, HLD, presents with AMS in setting of left knee pain     #Encephalopathy   Unclear etiology. Baseline patient is AAOx2. Resolving   - Infectious workup notable for E. Coli in UCx and possible PNA. Continue cefpodoxime to complete 5 day course   - Rheumatology performed arthrocentesis, noninfectious    Dispo: Pending SNF placement     Remainder as above
89F PMH hypothyroidism, Afib, GERD, HLD, presents with AMS in setting of left knee pain     #Encephalopathy   Unclear etiology. Baseline patient is AAOx2, now AAOx1. Differential is broad, given no evidence of focal etiology.   - Stroke code called, however with no focal deficits  - Continue repletion of electrolytes   - Will hold off on MR brain or vEEG at this time   - XR knee ordered to evaluate for signs of OM, continue antibiotics as ordered  - Metabolic workup as described above     Remainder as above
89F PMH hypothyroidism, Afib, GERD, HLD, presents with AMS in setting of left knee pain     #Encephalopathy   Unclear etiology. Baseline patient is AAOx2, improved from prior exam, and now approaching baseline.   - Infectious workup notable for E. Coli in UCx and possible PNA. Continue cefpodoxime  - Will hold off on MR brain or vEEG at this time, mental status at baseline   - Rheumatology performed arthrocentesis, will follow up results    Dispo: PT recommending SNF, will need to discuss with family     Remainder as above

## 2024-02-23 NOTE — PROVIDER CONTACT NOTE (OTHER) - ACTION/TREATMENT ORDERED:
Md came and assessed patient. RRT was called, then code stroke. See RRT/stroke sheet for details. Pt reverted back to normal self around 0200. MD Carrera made aware.
MD made aware. No new orders at the moment.

## 2024-02-23 NOTE — PROGRESS NOTE ADULT - PROBLEM SELECTOR PLAN 2
Pt w/ worsening warmth and swelling of L knee and pain, started yesterday. Unknown hx of trauma. Also w/ complaint of L shoulder pain, however appears normal. Ddx of L knee: septic joint vs. gout vs. pseudogout  -L knee xray shows moderate knee effusion  -L knee arthrocentesis on 02/21 elliot out 75cc  -R knee US on 02/22 shows complex effusion    Plan:  -f/u rheumatology recs Pt w/ worsening warmth and swelling of L knee and pain, started yesterday. Unknown hx of trauma. Also w/ complaint of L shoulder pain, however appears normal. Ddx of L knee: septic joint vs. gout vs. pseudogout  -L knee xray shows moderate knee effusion  -L knee arthrocentesis on 02/21 elliot out 75cc  -R knee US on 02/22 shows complex effusion    Plan:  - no treatment needed given symptom improvement after tap

## 2024-02-24 VITALS
DIASTOLIC BLOOD PRESSURE: 89 MMHG | SYSTOLIC BLOOD PRESSURE: 157 MMHG | RESPIRATION RATE: 18 BRPM | HEART RATE: 85 BPM | TEMPERATURE: 99 F | OXYGEN SATURATION: 96 %

## 2024-02-24 PROCEDURE — 83550 IRON BINDING TEST: CPT

## 2024-02-24 PROCEDURE — 85025 COMPLETE CBC W/AUTO DIFF WBC: CPT

## 2024-02-24 PROCEDURE — 85652 RBC SED RATE AUTOMATED: CPT

## 2024-02-24 PROCEDURE — 0042T: CPT | Mod: MA

## 2024-02-24 PROCEDURE — 82330 ASSAY OF CALCIUM: CPT

## 2024-02-24 PROCEDURE — 82140 ASSAY OF AMMONIA: CPT

## 2024-02-24 PROCEDURE — 82962 GLUCOSE BLOOD TEST: CPT

## 2024-02-24 PROCEDURE — 84100 ASSAY OF PHOSPHORUS: CPT

## 2024-02-24 PROCEDURE — 97110 THERAPEUTIC EXERCISES: CPT

## 2024-02-24 PROCEDURE — 89051 BODY FLUID CELL COUNT: CPT

## 2024-02-24 PROCEDURE — 84132 ASSAY OF SERUM POTASSIUM: CPT

## 2024-02-24 PROCEDURE — 82607 VITAMIN B-12: CPT

## 2024-02-24 PROCEDURE — 85014 HEMATOCRIT: CPT

## 2024-02-24 PROCEDURE — 36415 COLL VENOUS BLD VENIPUNCTURE: CPT

## 2024-02-24 PROCEDURE — 82728 ASSAY OF FERRITIN: CPT

## 2024-02-24 PROCEDURE — 82746 ASSAY OF FOLIC ACID SERUM: CPT

## 2024-02-24 PROCEDURE — 84550 ASSAY OF BLOOD/URIC ACID: CPT

## 2024-02-24 PROCEDURE — 84425 ASSAY OF VITAMIN B-1: CPT

## 2024-02-24 PROCEDURE — 82435 ASSAY OF BLOOD CHLORIDE: CPT

## 2024-02-24 PROCEDURE — 83605 ASSAY OF LACTIC ACID: CPT

## 2024-02-24 PROCEDURE — 85018 HEMOGLOBIN: CPT

## 2024-02-24 PROCEDURE — 80053 COMPREHEN METABOLIC PANEL: CPT

## 2024-02-24 PROCEDURE — 71045 X-RAY EXAM CHEST 1 VIEW: CPT

## 2024-02-24 PROCEDURE — 84443 ASSAY THYROID STIM HORMONE: CPT

## 2024-02-24 PROCEDURE — 81001 URINALYSIS AUTO W/SCOPE: CPT

## 2024-02-24 PROCEDURE — 87637 SARSCOV2&INF A&B&RSV AMP PRB: CPT

## 2024-02-24 PROCEDURE — 96374 THER/PROPH/DIAG INJ IV PUSH: CPT

## 2024-02-24 PROCEDURE — 84484 ASSAY OF TROPONIN QUANT: CPT

## 2024-02-24 PROCEDURE — 92526 ORAL FUNCTION THERAPY: CPT

## 2024-02-24 PROCEDURE — 92610 EVALUATE SWALLOWING FUNCTION: CPT

## 2024-02-24 PROCEDURE — 87070 CULTURE OTHR SPECIMN AEROBIC: CPT

## 2024-02-24 PROCEDURE — 86850 RBC ANTIBODY SCREEN: CPT

## 2024-02-24 PROCEDURE — 97116 GAIT TRAINING THERAPY: CPT

## 2024-02-24 PROCEDURE — 87635 SARS-COV-2 COVID-19 AMP PRB: CPT

## 2024-02-24 PROCEDURE — 70450 CT HEAD/BRAIN W/O DYE: CPT | Mod: MA

## 2024-02-24 PROCEDURE — 97162 PT EVAL MOD COMPLEX 30 MIN: CPT

## 2024-02-24 PROCEDURE — 86900 BLOOD TYPING SEROLOGIC ABO: CPT

## 2024-02-24 PROCEDURE — 89060 EXAM SYNOVIAL FLUID CRYSTALS: CPT

## 2024-02-24 PROCEDURE — 83540 ASSAY OF IRON: CPT

## 2024-02-24 PROCEDURE — 99232 SBSQ HOSP IP/OBS MODERATE 35: CPT

## 2024-02-24 PROCEDURE — 85610 PROTHROMBIN TIME: CPT

## 2024-02-24 PROCEDURE — 82803 BLOOD GASES ANY COMBINATION: CPT

## 2024-02-24 PROCEDURE — 86140 C-REACTIVE PROTEIN: CPT

## 2024-02-24 PROCEDURE — 87640 STAPH A DNA AMP PROBE: CPT

## 2024-02-24 PROCEDURE — 87205 SMEAR GRAM STAIN: CPT

## 2024-02-24 PROCEDURE — 70498 CT ANGIOGRAPHY NECK: CPT | Mod: MA

## 2024-02-24 PROCEDURE — 87075 CULTR BACTERIA EXCEPT BLOOD: CPT

## 2024-02-24 PROCEDURE — 84446 ASSAY OF VITAMIN E: CPT

## 2024-02-24 PROCEDURE — 87086 URINE CULTURE/COLONY COUNT: CPT

## 2024-02-24 PROCEDURE — 83735 ASSAY OF MAGNESIUM: CPT

## 2024-02-24 PROCEDURE — 85730 THROMBOPLASTIN TIME PARTIAL: CPT

## 2024-02-24 PROCEDURE — 93971 EXTREMITY STUDY: CPT

## 2024-02-24 PROCEDURE — 96375 TX/PRO/DX INJ NEW DRUG ADDON: CPT

## 2024-02-24 PROCEDURE — 99285 EMERGENCY DEPT VISIT HI MDM: CPT

## 2024-02-24 PROCEDURE — 82947 ASSAY GLUCOSE BLOOD QUANT: CPT

## 2024-02-24 PROCEDURE — 82652 VIT D 1 25-DIHYDROXY: CPT

## 2024-02-24 PROCEDURE — 87040 BLOOD CULTURE FOR BACTERIA: CPT

## 2024-02-24 PROCEDURE — 87641 MR-STAPH DNA AMP PROBE: CPT

## 2024-02-24 PROCEDURE — 82550 ASSAY OF CK (CPK): CPT

## 2024-02-24 PROCEDURE — 85027 COMPLETE CBC AUTOMATED: CPT

## 2024-02-24 PROCEDURE — 86901 BLOOD TYPING SEROLOGIC RH(D): CPT

## 2024-02-24 PROCEDURE — 87186 SC STD MICRODIL/AGAR DIL: CPT

## 2024-02-24 PROCEDURE — 73562 X-RAY EXAM OF KNEE 3: CPT

## 2024-02-24 PROCEDURE — 80048 BASIC METABOLIC PNL TOTAL CA: CPT

## 2024-02-24 PROCEDURE — 84295 ASSAY OF SERUM SODIUM: CPT

## 2024-02-24 PROCEDURE — 70496 CT ANGIOGRAPHY HEAD: CPT | Mod: MA

## 2024-02-24 PROCEDURE — 86713 LEGIONELLA ANTIBODY: CPT

## 2024-02-24 PROCEDURE — 84439 ASSAY OF FREE THYROXINE: CPT

## 2024-02-24 PROCEDURE — 76882 US LMTD JT/FCL EVL NVASC XTR: CPT

## 2024-02-24 PROCEDURE — 97530 THERAPEUTIC ACTIVITIES: CPT

## 2024-02-24 RX ADMIN — Medication 50 MICROGRAM(S): at 04:44

## 2024-02-24 RX ADMIN — Medication 200 MILLIGRAM(S): at 04:44

## 2024-02-24 NOTE — DISCHARGE NOTE NURSING/CASE MANAGEMENT/SOCIAL WORK - NSTRANSFERBELONGINGSDISPO_GEN_A_NUR
with patient Normal vision: sees adequately in most situations; can see medication labels, newsprint

## 2024-02-24 NOTE — PROGRESS NOTE ADULT - PROBLEM SELECTOR PLAN 2
Pt w/ worsening warmth and swelling of L knee and pain, started yesterday. Unknown hx of trauma. Also w/ complaint of L shoulder pain, however appears normal. Ddx of L knee: septic joint vs. gout vs. pseudogout  -L knee xray shows moderate knee effusion  -L knee arthrocentesis on 02/21 elliot out 75cc  -R knee US on 02/22 shows complex effusion    Plan:  - no treatment needed given symptom improvement after tap

## 2024-02-24 NOTE — PROGRESS NOTE ADULT - SUBJECTIVE AND OBJECTIVE BOX
DATE OF SERVICE: 02-24-24 @ 07:53    Patient is a 89y old  Female who presents with a chief complaint of AMS, garbled speech (23 Feb 2024 07:38)      SUBJECTIVE / OVERNIGHT EVENTS:    MEDICATIONS  (STANDING):  ascorbic acid 500 milliGRAM(s) Oral daily  cefpodoxime 200 milliGRAM(s) Oral every 12 hours  chlorhexidine 4% Liquid 1 Application(s) Topical daily  enoxaparin Injectable 40 milliGRAM(s) SubCutaneous every 24 hours  levothyroxine 50 MICROGram(s) Oral daily  multivitamin 1 Tablet(s) Oral daily    MEDICATIONS  (PRN):  melatonin 3 milliGRAM(s) Oral at bedtime PRN Sleep  ondansetron Injectable 4 milliGRAM(s) IV Push every 8 hours PRN Nausea and/or Vomiting      Vital Signs Last 24 Hrs  T(C): 36.7 (24 Feb 2024 04:17), Max: 37.1 (23 Feb 2024 13:00)  T(F): 98 (24 Feb 2024 04:17), Max: 98.8 (23 Feb 2024 13:00)  HR: 86 (24 Feb 2024 04:17) (73 - 99)  BP: 143/81 (24 Feb 2024 04:17) (125/97 - 146/79)  BP(mean): --  RR: 18 (24 Feb 2024 04:17) (18 - 18)  SpO2: 97% (24 Feb 2024 04:17) (94% - 97%)    Parameters below as of 24 Feb 2024 04:17  Patient On (Oxygen Delivery Method): room air      CAPILLARY BLOOD GLUCOSE        I&O's Summary    23 Feb 2024 07:01  -  24 Feb 2024 07:00  --------------------------------------------------------  IN: 0 mL / OUT: 850 mL / NET: -850 mL        PHYSICAL EXAM:  GENERAL: NAD, well-developed  HEAD:  Atraumatic, Normocephalic  EYES: EOMI, PERRLA, conjunctiva and sclera clear  NECK: Supple, No JVD  CHEST/LUNG: Clear to auscultation bilaterally; No wheeze  HEART: Regular rate and rhythm; No murmurs, rubs, or gallops  ABDOMEN: Soft, Nontender, Nondistended; Bowel sounds present  EXTREMITIES:  2+ Peripheral Pulses, No clubbing, cyanosis, or edema  PSYCH: AAOx3  NEUROLOGY: non-focal  SKIN: No rashes or lesions    LABS:                        10.4   4.61  )-----------( 139      ( 23 Feb 2024 07:52 )             33.0     02-23    139  |  99  |  6<L>  ----------------------------<  90  3.6   |  26  |  0.67    Ca    8.2<L>      23 Feb 2024 07:52  Phos  2.2     02-23  Mg     1.8     02-23    TPro  6.3  /  Alb  3.6  /  TBili  0.5  /  DBili  x   /  AST  19  /  ALT  8<L>  /  AlkPhos  85  02-23          Urinalysis Basic - ( 23 Feb 2024 07:52 )    Color: x / Appearance: x / SG: x / pH: x  Gluc: 90 mg/dL / Ketone: x  / Bili: x / Urobili: x   Blood: x / Protein: x / Nitrite: x   Leuk Esterase: x / RBC: x / WBC x   Sq Epi: x / Non Sq Epi: x / Bacteria: x        RADIOLOGY & ADDITIONAL TESTS:    Imaging Personally Reviewed:    Consultant(s) Notes Reviewed:      Care Discussed with Consultants/Other Providers:   DATE OF SERVICE: 02-24-24 @ 07:53    Patient is a 89y old  Female who presents with a chief complaint of AMS, garbled speech (23 Feb 2024 07:38)      SUBJECTIVE / OVERNIGHT EVENTS: NAEO. Patient feeling okay. Wants to go home.    MEDICATIONS  (STANDING):  ascorbic acid 500 milliGRAM(s) Oral daily  cefpodoxime 200 milliGRAM(s) Oral every 12 hours  chlorhexidine 4% Liquid 1 Application(s) Topical daily  enoxaparin Injectable 40 milliGRAM(s) SubCutaneous every 24 hours  levothyroxine 50 MICROGram(s) Oral daily  multivitamin 1 Tablet(s) Oral daily    MEDICATIONS  (PRN):  melatonin 3 milliGRAM(s) Oral at bedtime PRN Sleep  ondansetron Injectable 4 milliGRAM(s) IV Push every 8 hours PRN Nausea and/or Vomiting      Vital Signs Last 24 Hrs  T(C): 36.7 (24 Feb 2024 04:17), Max: 37.1 (23 Feb 2024 13:00)  T(F): 98 (24 Feb 2024 04:17), Max: 98.8 (23 Feb 2024 13:00)  HR: 86 (24 Feb 2024 04:17) (73 - 99)  BP: 143/81 (24 Feb 2024 04:17) (125/97 - 146/79)  BP(mean): --  RR: 18 (24 Feb 2024 04:17) (18 - 18)  SpO2: 97% (24 Feb 2024 04:17) (94% - 97%)    Parameters below as of 24 Feb 2024 04:17  Patient On (Oxygen Delivery Method): room air      CAPILLARY BLOOD GLUCOSE        I&O's Summary    23 Feb 2024 07:01  -  24 Feb 2024 07:00  --------------------------------------------------------  IN: 0 mL / OUT: 850 mL / NET: -850 mL        PHYSICAL EXAM:  GENERAL: NAD, confused, interactive, pleasant  HEAD:  Atraumatic, Normocephalic  EYES: EOMI, conjunctiva and sclera clear  CHEST/LUNG: Clear to auscultation bilaterally; No wheeze  HEART: irregular rate, no murmurs  ABDOMEN: Soft, Nontender, Nondistended; Bowel sounds present  EXTREMITIES:  2+ Peripheral Pulses, No tenderness, edema, or erythema  PSYCH: AAOx1-2  NEUROLOGY: non-focal  SKIN: No rashes or lesions    LABS:                        10.4   4.61  )-----------( 139      ( 23 Feb 2024 07:52 )             33.0     02-23    139  |  99  |  6<L>  ----------------------------<  90  3.6   |  26  |  0.67    Ca    8.2<L>      23 Feb 2024 07:52  Phos  2.2     02-23  Mg     1.8     02-23    TPro  6.3  /  Alb  3.6  /  TBili  0.5  /  DBili  x   /  AST  19  /  ALT  8<L>  /  AlkPhos  85  02-23          Urinalysis Basic - ( 23 Feb 2024 07:52 )    Color: x / Appearance: x / SG: x / pH: x  Gluc: 90 mg/dL / Ketone: x  / Bili: x / Urobili: x   Blood: x / Protein: x / Nitrite: x   Leuk Esterase: x / RBC: x / WBC x   Sq Epi: x / Non Sq Epi: x / Bacteria: x        RADIOLOGY & ADDITIONAL TESTS:    Imaging Personally Reviewed:    Consultant(s) Notes Reviewed:      Care Discussed with Consultants/Other Providers:

## 2024-02-24 NOTE — DISCHARGE NOTE NURSING/CASE MANAGEMENT/SOCIAL WORK - NSDCFUADDAPPT_GEN_ALL_CORE_FT
APPTS ARE READY TO BE MADE: [X ] YES    Best Family or Patient Contact (if needed):    Additional Information about above appointments (if needed):    1: Gagan  2:   3:     Other comments or requests:

## 2024-02-24 NOTE — DISCHARGE NOTE NURSING/CASE MANAGEMENT/SOCIAL WORK - PATIENT PORTAL LINK FT
You can access the FollowMyHealth Patient Portal offered by [facility] by registering at the following website: [email or web address]. By joining [facility]’s FollowMyHealth portal, you will also be able to view your health information using other applications (apps) compatible with our system.

## 2024-02-24 NOTE — PROGRESS NOTE ADULT - PROBLEM SELECTOR PLAN 1
Baseline A&Ox2, presently A&OX1 w/ garbled speech. Pt w/ change this morning, appearing more lethargic w/ change in speech.     -Ddx: infectious (pneumonia, UTI, septic joint) vs. metabolic vs. electrolyte abnormality. Less likely neurologic   -code stroke called on 2/18, w/ nonlateralizing neuro exam  -CT head 2/18: no acute findings  -mag and calcium low, s/p repletions  -Urine culture positive for E Coli  -CXR shows right basilar pneumonia, possible aspiration    Plan:   -PT recommends SNF, family agreeable but hopes [facility] if possible  -on minced and moist diet with mildly thickened liquids Baseline A&Ox2, presently A&OX1 w/ garbled speech. Pt w/ change this morning, appearing more lethargic w/ change in speech.     -Ddx: infectious (pneumonia, UTI, septic joint) vs. metabolic vs. electrolyte abnormality. Less likely neurologic   -code stroke called on 2/18, w/ nonlateralizing neuro exam  -CT head 2/18: no acute findings  -mag and calcium low, s/p repletions  -Urine culture positive for E Coli  -CXR shows right basilar pneumonia, possible aspiration    Plan:   -dispo to SNF  -on minced and moist diet with mildly thickened liquids

## 2024-02-24 NOTE — PROGRESS NOTE ADULT - PROVIDER SPECIALTY LIST ADULT
Podiatry
Neurology
Rheumatology
Internal Medicine

## 2024-02-24 NOTE — PROGRESS NOTE ADULT - REASON FOR ADMISSION
AMS, garbled speech

## 2024-02-24 NOTE — DISCHARGE NOTE NURSING/CASE MANAGEMENT/SOCIAL WORK - NSDCPEFALRISK_GEN_ALL_CORE
For information on Fall & Injury Prevention, visit: [email or web address] OR  [email or web address] OR  https://www.cdc.gov/steadi/patient.html

## 2024-02-27 LAB
CULTURE RESULTS: SIGNIFICANT CHANGE UP
CULTURE RESULTS: SIGNIFICANT CHANGE UP
SPECIMEN SOURCE: SIGNIFICANT CHANGE UP
SPECIMEN SOURCE: SIGNIFICANT CHANGE UP

## 2024-03-06 LAB
CULTURE RESULTS: SIGNIFICANT CHANGE UP
SPECIMEN SOURCE: SIGNIFICANT CHANGE UP